# Patient Record
Sex: FEMALE | Race: WHITE | Employment: FULL TIME | URBAN - NONMETROPOLITAN AREA
[De-identification: names, ages, dates, MRNs, and addresses within clinical notes are randomized per-mention and may not be internally consistent; named-entity substitution may affect disease eponyms.]

---

## 2019-04-08 ENCOUNTER — HOSPITAL ENCOUNTER (EMERGENCY)
Age: 49
Discharge: HOME | End: 2019-04-08
Payer: MEDICARE

## 2019-04-08 VITALS
HEART RATE: 56 BPM | DIASTOLIC BLOOD PRESSURE: 58 MMHG | TEMPERATURE: 99.1 F | WEIGHT: 119 LBS | RESPIRATION RATE: 16 BRPM | OXYGEN SATURATION: 98 % | BODY MASS INDEX: 18.64 KG/M2 | SYSTOLIC BLOOD PRESSURE: 115 MMHG

## 2019-04-08 DIAGNOSIS — K12.1 STOMATITIS: Primary | ICD-10-CM

## 2019-04-08 LAB
BASOPHILS # BLD: 0.6 %
BASOPHILS ABSOLUTE: 0 THOU/MM3 (ref 0–0.1)
EOSINOPHIL # BLD: 1.8 %
EOSINOPHILS ABSOLUTE: 0.1 THOU/MM3 (ref 0–0.4)
HCT VFR BLD CALC: 34.2 % (ref 37–47)
HEMOGLOBIN: 11.2 GM/DL (ref 12–16)
IMMATURE GRANS (ABS): 0.02 THOU/MM3 (ref 0–0.07)
IMMATURE GRANULOCYTES: 0.3 %
LYMPHOCYTES # BLD: 16 %
LYMPHOCYTES ABSOLUTE: 1 THOU/MM3 (ref 1–4.8)
MCH RBC QN AUTO: 28.6 PG (ref 27–31)
MCHC RBC AUTO-ENTMCNC: 32.7 GM/DL (ref 33–37)
MCV RBC AUTO: 88 FL (ref 81–99)
MONOCYTES # BLD: 8.3 %
MONOCYTES ABSOLUTE: 0.5 THOU/MM3 (ref 0.4–1.3)
NUCLEATED RED BLOOD CELLS: 0 /100 WBC
PDW BLD-RTO: 15.5 % (ref 11.5–14.5)
PLATELET # BLD: 227 THOU/MM3 (ref 130–400)
PMV BLD AUTO: 12.6 FL (ref 7.4–10.4)
RBC # BLD: 3.91 MILL/MM3 (ref 4.2–5.4)
SEG NEUTROPHILS: 73 %
SEGMENTED NEUTROPHILS ABSOLUTE COUNT: 4.5 THOU/MM3 (ref 1.8–7.7)
WBC # BLD: 6.2 THOU/MM3 (ref 4.8–10.8)

## 2019-04-08 PROCEDURE — 36415 COLL VENOUS BLD VENIPUNCTURE: CPT

## 2019-04-08 PROCEDURE — 99202 OFFICE O/P NEW SF 15 MIN: CPT | Performed by: NURSE PRACTITIONER

## 2019-04-08 PROCEDURE — 99203 OFFICE O/P NEW LOW 30 MIN: CPT

## 2019-04-08 PROCEDURE — 85025 COMPLETE CBC W/AUTO DIFF WBC: CPT

## 2019-04-08 RX ORDER — VALACYCLOVIR HYDROCHLORIDE 1 G/1
1000 TABLET, FILM COATED ORAL 3 TIMES DAILY
Qty: 21 TABLET | Refills: 0 | Status: SHIPPED | OUTPATIENT
Start: 2019-04-08 | End: 2019-04-15

## 2019-04-08 ASSESSMENT — ENCOUNTER SYMPTOMS
NAUSEA: 0
VOMITING: 0
ABDOMINAL PAIN: 0
VOICE CHANGE: 0
SHORTNESS OF BREATH: 0
SORE THROAT: 0

## 2019-04-08 ASSESSMENT — PAIN SCALES - GENERAL: PAINLEVEL_OUTOF10: 4

## 2019-04-08 ASSESSMENT — PAIN - FUNCTIONAL ASSESSMENT: PAIN_FUNCTIONAL_ASSESSMENT: ACTIVITIES ARE NOT PREVENTED

## 2019-04-08 ASSESSMENT — PAIN DESCRIPTION - LOCATION: LOCATION: MOUTH

## 2019-04-08 ASSESSMENT — PAIN DESCRIPTION - PAIN TYPE: TYPE: ACUTE PAIN

## 2019-04-08 NOTE — ED PROVIDER NOTES
[facility]       CHIEF COMPLAINT       Chief Complaint   Patient presents with    Mouth Lesions     Sores under tongue. Started today. Nurses Notes reviewed and I agree except as noted in the HPI. HISTORY OF PRESENT ILLNESS   Ainsley Storey is a 50 y.o. female who presents for evaluation of tongue/mouth lesions. Patient reports that roughly 2 weeks ago she began have canker sores on her lower lip. She states that these have not one away, however today she noticed \"bumps\" on the underside of her tongue. She states that she is sure that these first started today and it seems to be spreading quickly. She states that she has also had a \"codeine\" over the top of her tongue. She states that she has been under a lot of stress recently. She reports a history of anemia and vitamin B12 deficiency and is concerned that this could be an issue. The history is provided by the patient. REVIEW OF SYSTEMS     Review of Systems   Constitutional: Negative for chills and fever. HENT: Positive for mouth sores. Negative for sore throat and voice change. Respiratory: Negative for shortness of breath. Cardiovascular: Negative for chest pain. Gastrointestinal: Negative for abdominal pain, nausea and vomiting. Musculoskeletal: Negative for arthralgias and myalgias. Skin: Negative for rash. PAST MEDICAL HISTORY         Diagnosis Date    Acute CVA (cerebrovascular accident) [facility]) 1998    Dr. Becca Phillips antibody syndrome [facility])     takes Coumadin    Arrhythmia     Disease of blood and blood forming organ     SVT (supraventricular tachycardia) ([address]        SURGICALHISTORY     Patient  has a past surgical history that includes ablation of dysrhythmic focus; Atrial ablation surgery; Atrial ablation surgery; and Atrial ablation surgery.     CURRENT MEDICATIONS       Previous Medications    ALPRAZOLAM (XANAX PO)    Take by mouth ATENOLOL (TENORMIN) 50 MG TABLET    Take 50 mg by mouth 2 times daily. VERAPAMIL (VERELAN PM) 240 MG CR CAPSULE    Take 240 mg by mouth nightly. WARFARIN (COUMADIN) 5 MG TABLET    Take 5 mg by mouth daily. ALLERGIES     Patient is is allergic to bactrim [sulfamethoxazole-trimethoprim]; erythromycin; other; shellfish-derived products; shrimp flavor; tetracyclines & related; and augmentin [amoxicillin-pot clavulanate]. Patients   There is no immunization history on file for this patient. FAMILY HISTORY     Patient's family history is not on file. SOCIAL HISTORY     Patient  reports that she has never smoked. She has never used smokeless tobacco. She reports that she does not drink alcohol or use drugs. PHYSICAL EXAM     ED TRIAGE VITALS  BP: (!) 115/58, Temp: 99.1 °F (37.3 °C), Pulse: 56, Resp: 16, SpO2: 98 %,Estimated body mass index is 18.64 kg/m² as calculated from the following:    Height as of 6/24/14: 5' 7\" (1.702 m). Weight as of this encounter: 119 lb (54 kg). ,Patient's last menstrual period was 03/18/2019 (exact date). Physical Exam   Constitutional: She is oriented to person, place, and time. She appears well-developed and well-nourished. No distress. HENT:   Mouth/Throat: Oral lesions present. No dental caries. Canker sore is noted to the right lower lip. There are multiple reddened, raised bumps noted to the underside of the tongue as well as a creamy white coating to the top of the tongue noted. Pulmonary/Chest: Effort normal. No respiratory distress. Neurological: She is alert and oriented to person, place, and time. No cranial nerve deficit. Skin: Skin is warm and dry. No rash noted. She is not diaphoretic. Psychiatric: She has a normal mood and affect. Nursing note and vitals reviewed.       DIAGNOSTIC RESULTS     Labs:  Results for orders placed or performed during the hospital encounter of 04/08/19   CBC auto differential   Result Value Ref Range    WBC 6.2

## 2021-09-24 ENCOUNTER — TELEPHONE (OUTPATIENT)
Dept: FAMILY MEDICINE CLINIC | Age: 51
End: 2021-09-24

## 2021-09-24 ENCOUNTER — OFFICE VISIT (OUTPATIENT)
Dept: FAMILY MEDICINE CLINIC | Age: 51
End: 2021-09-24
Payer: MEDICARE

## 2021-09-24 VITALS
SYSTOLIC BLOOD PRESSURE: 108 MMHG | TEMPERATURE: 97.3 F | WEIGHT: 125.8 LBS | RESPIRATION RATE: 16 BRPM | OXYGEN SATURATION: 99 % | BODY MASS INDEX: 19.74 KG/M2 | HEART RATE: 54 BPM | HEIGHT: 67 IN | DIASTOLIC BLOOD PRESSURE: 62 MMHG

## 2021-09-24 DIAGNOSIS — D68.61 ANTI-PHOSPHOLIPID ANTIBODY SYNDROME (HCC): Primary | ICD-10-CM

## 2021-09-24 DIAGNOSIS — F41.1 GAD (GENERALIZED ANXIETY DISORDER): ICD-10-CM

## 2021-09-24 PROCEDURE — 3017F COLORECTAL CA SCREEN DOC REV: CPT | Performed by: NURSE PRACTITIONER

## 2021-09-24 PROCEDURE — 99204 OFFICE O/P NEW MOD 45 MIN: CPT | Performed by: NURSE PRACTITIONER

## 2021-09-24 PROCEDURE — 1036F TOBACCO NON-USER: CPT | Performed by: NURSE PRACTITIONER

## 2021-09-24 PROCEDURE — G8420 CALC BMI NORM PARAMETERS: HCPCS | Performed by: NURSE PRACTITIONER

## 2021-09-24 PROCEDURE — G8427 DOCREV CUR MEDS BY ELIG CLIN: HCPCS | Performed by: NURSE PRACTITIONER

## 2021-09-24 RX ORDER — ALPRAZOLAM 0.25 MG/1
TABLET ORAL
Status: CANCELLED | OUTPATIENT
Start: 2021-09-24

## 2021-09-24 RX ORDER — ALPRAZOLAM 0.25 MG/1
0.25 TABLET ORAL 2 TIMES DAILY PRN
Qty: 60 TABLET | Refills: 0 | Status: SHIPPED | OUTPATIENT
Start: 2021-09-24 | End: 2021-10-25 | Stop reason: SDUPTHER

## 2021-09-24 RX ORDER — WARFARIN SODIUM 5 MG/1
5 TABLET ORAL DAILY
Qty: 30 TABLET | Refills: 1 | Status: SHIPPED | OUTPATIENT
Start: 2021-09-24 | End: 2021-10-22

## 2021-09-24 RX ORDER — ALPRAZOLAM 0.25 MG/1
TABLET ORAL
COMMUNITY
Start: 2021-07-13 | End: 2021-09-24 | Stop reason: SDUPTHER

## 2021-09-24 SDOH — ECONOMIC STABILITY: FOOD INSECURITY: WITHIN THE PAST 12 MONTHS, YOU WORRIED THAT YOUR FOOD WOULD RUN OUT BEFORE YOU GOT MONEY TO BUY MORE.: NEVER TRUE

## 2021-09-24 SDOH — ECONOMIC STABILITY: FOOD INSECURITY: WITHIN THE PAST 12 MONTHS, THE FOOD YOU BOUGHT JUST DIDN'T LAST AND YOU DIDN'T HAVE MONEY TO GET MORE.: NEVER TRUE

## 2021-09-24 ASSESSMENT — PATIENT HEALTH QUESTIONNAIRE - PHQ9
SUM OF ALL RESPONSES TO PHQ QUESTIONS 1-9: 0
2. FEELING DOWN, DEPRESSED OR HOPELESS: 0
SUM OF ALL RESPONSES TO PHQ QUESTIONS 1-9: 0
SUM OF ALL RESPONSES TO PHQ QUESTIONS 1-9: 0
SUM OF ALL RESPONSES TO PHQ9 QUESTIONS 1 & 2: 0
1. LITTLE INTEREST OR PLEASURE IN DOING THINGS: 0

## 2021-09-24 ASSESSMENT — SOCIAL DETERMINANTS OF HEALTH (SDOH): HOW HARD IS IT FOR YOU TO PAY FOR THE VERY BASICS LIKE FOOD, HOUSING, MEDICAL CARE, AND HEATING?: NOT VERY HARD

## 2021-09-24 NOTE — PROGRESS NOTES
[address] MEDICINE [address]  [address] [place] [address]  Dept: [phone]  Loc: [phone]  Visit Date: 9/24/2021      Chief Complaint:   Wes Hernandez is a 46 y.o. female thatpresents for Medication Refill    HPI:  Comes in today to get established and for refills  On Warfarin for Anti-phospholipid Antibody Syndrome  Taking 5 mg daily  No INR for about 3 weeks     Diet: eats well, drinking a lot of water  Exercise: running   Sleep: at times when anxious  PMH: stroke,SVT, anti-phospholipid antibody syndrome, KIMANI  Surgical hx:ablations  Social: non-smoker, non-drinker  Concerns today: needs refills and labs    Anxiety   Inciting events or triggers for anxiety - daily stressors and medical problems   Frequency of anxiety - daily  Panic attacks? Yes   Symptoms of panic attacks -  feelings of losing control and racing thoughts  Sleep Disturbances? Yes - falling asleep   Impaired concentration? Yes  Substance abuse? No  Suicidal/Homicidal Ideation? No    Compliant with meds: yes  Med side effects: No    Sees therapist?: Yes - seeing one now  Family History of Mental Illness? No      The patient comes in alone today. History obtained from pt and medical records. I have reviewed the patient's past medical history, past surgical history, allergies,medications, social and family history and I have made updates where appropriate.     Past Medical History:   Diagnosis Date    Acute CVA (cerebrovascular accident) [facility]) 1998    Dr. Gan Ar antibody syndrome [facility])     takes Coumadin    Arrhythmia     Disease of blood and blood forming organ     SVT (supraventricular tachycardia) (HCC)        Past Surgical History:   Procedure Laterality Date    ABLATION OF DYSRHYTHMIC FOCUS      ATRIAL ABLATION SURGERY      ATRIAL ABLATION SURGERY      ATRIAL ABLATION SURGERY         Social History     Tobacco Use    Smoking status: Never Smoker    Smokeless tobacco: Never Used   Substance Use Topics    Alcohol use: No    Drug use: No       History reviewed. No pertinent family history. PHYSICAL EXAM:  /62   Pulse 54   Temp 97.3 °F (36.3 °C) (Infrared)   Resp 16   Ht 5' 7\" (1.702 m)   Wt 125 lb 12.8 oz (57.1 kg)   LMP 07/16/2021   SpO2 99%   BMI 19.70 kg/m²     Physical Exam  Constitutional:       Appearance: Normal appearance. HENT:      Head: Normocephalic and atraumatic. Right Ear: External ear normal.      Left Ear: External ear normal.      Nose: Nose normal.      Mouth/Throat:      Mouth: Mucous membranes are moist.   Eyes:      Conjunctiva/sclera: Conjunctivae normal.   Cardiovascular:      Rate and Rhythm: Normal rate and regular rhythm. Pulses: Normal pulses. Heart sounds: Normal heart sounds. Pulmonary:      Effort: Pulmonary effort is normal.      Breath sounds: Normal breath sounds. Abdominal:      General: Bowel sounds are normal.      Palpations: Abdomen is soft. Musculoskeletal:         General: Normal range of motion. Cervical back: Normal range of motion. Skin:     General: Skin is warm and dry. Neurological:      General: No focal deficit present. Mental Status: She is alert and oriented to person, place, and time. Psychiatric:         Mood and Affect: Mood normal.         Behavior: Behavior normal.             ASSESSMENT & PLAN  Patricio Luevano was seen today for medication refill. Diagnoses and all orders for this visit:    Anti-phospholipid antibody syndrome (HCC)  -     warfarin (COUMADIN) 5 MG tablet; Take 1 tablet by mouth daily  -     Protime-INR; Future  -     CBC Auto Differential; Future  -     Comprehensive Metabolic Panel; Future  -     Lipid Panel; Future  -     Sedimentation Rate;  Future  -     PT/INR Testing Monitor KIT; 1 kit by Does not apply route as needed (weekly prn to monitor INR)    KIMANI (generalized anxiety disorder)  -     CBC Auto Differential; Future  -     Comprehensive

## 2021-09-24 NOTE — TELEPHONE ENCOUNTER
Can we get recent records from Dr. Olivares [facility] office?   Electronically signed by PB Miranda CNP on 9/24/2021 at 3:58 PM

## 2021-09-24 NOTE — PATIENT INSTRUCTIONS
Follow up in 1 month      Patient Education        Learning About Generalized Anxiety Disorder  What is generalized anxiety disorder? We all worry. It's a normal part of life. But when you have generalized anxiety disorder, you worry about lots of things and have a hard time stopping your worry. This worry or anxiety interferes with your relationships, work, and life. What causes it? The cause of generalized anxiety disorder is not known. Some studies show that it might be passed down through families. Several things can cause symptoms of anxiety. They include some health problems, some medicines, too much caffeine, and illegal drugs such as cocaine. What are the symptoms? Generalized anxiety disorder can make you feel worried and stressed about many things almost every day. You may have a hard time controlling your worry. Symptoms include:  · Feeling tired or cranky. You may have a hard time concentrating. · Having headaches or muscle aches. · Feeling shaky, sweating, or having hot flashes. · Feeling lightheaded, sick to your stomach, or out of breath. · Feeling like you can't relax. Or being startled easily. · Having problems falling or staying asleep. How is it diagnosed? Your doctor will ask about your health and how often you worry or feel anxious. People with generalized anxiety disorder have more worry and stress than normal. They feel worried and stressed about many things almost every day. And these feelings have lasted for at least 6 months. Your doctor also may ask about other symptoms, like whether you:  · Feel restless. · Feel tired. · Have a hard time thinking or feel that your mind goes blank. · Feel cranky. · Have tense muscles. · Have sleep problems. A physical exam and tests can help make sure that your symptoms aren't caused by a different condition, such as a thyroid problem. How is it treated? Counseling and medicine can both work to treat anxiety.  The two are often used along with lifestyle changes. Cognitive-behavioral therapy (CBT) is a type of counseling that's used to help treat anxiety. In CBT, you learn how to notice and replace thoughts that make you feel worried. It also can help you learn how to relax when you worry. Applied relaxation therapy may also be used. Your counselor might ask you to imagine a calming situation. This can help you relax. Medicines can help. These medicines are often also used for depression. Selective serotonin reuptake inhibitors (SSRIs) are often tried first. But there are other medicines that your doctor may use. You may need to try a few medicines to find one that works well. Many people feel better by getting regular exercise, eating healthy meals, and getting good sleep. Mindfulness--focusing on things in the present moment--also can help reduce your anxiety. What can you expect when you have it? Having anxiety can be upsetting. Some people might feel less worried and stressed after a couple of months of treatment. But for other people, it might take longer to feel better. Reaching out to people for help is important. Try not to isolate yourself. Let your family and friends help you. Find someone you can trust and confide in. Talk to that person. When you know what anxiety is--and how you can get help for it--you can start to learn new ways of thinking. This can help you cope and work through your anxiety. Follow-up care is a key part of your treatment and safety. Be sure to make and go to all appointments, and call your doctor if you are having problems. It's also a good idea to know your test results and keep a list of the medicines you take. Where can you learn more? Go to https://cas.[employer]. org and sign in to your [employer] account. Enter G110 in the FlightStats box to learn more about \"Learning About Generalized Anxiety Disorder. \"     If you do not have an account, please click on the \"Sign Up Now\"

## 2021-09-30 ENCOUNTER — TELEPHONE (OUTPATIENT)
Dept: FAMILY MEDICINE CLINIC | Age: 51
End: 2021-09-30

## 2021-10-14 NOTE — TELEPHONE ENCOUNTER
I remember signing a paper for her home INR machine. Have we heard anything back from them? It's ok that she hasn't been able to get her labs done. Whenever she gets a chance is fine with me.       Electronically signed by PB Garrett CNP on 10/14/2021 at 12:31 PM

## 2021-10-14 NOTE — TELEPHONE ENCOUNTER
Called and spoke with Edward Bravo at Dr Caal's office at [phone]. They will send most recent previous records to our office. She states if pt's whole record is required, they will need pt to sign a release of records at our office and we can fax it to them at fax [phone] and they will get them faxed to us. Pt states she has not had time to get her lab work done. She has been working at a [employer] unit, and then a family member recently passed away. She wanted Deana Nagel to know did not want to be knows as non-compliant. She is wanting to know if we have found anything out about the INR mach?

## 2021-10-15 ENCOUNTER — NURSE ONLY (OUTPATIENT)
Dept: LAB | Age: 51
End: 2021-10-15

## 2021-10-15 DIAGNOSIS — F41.1 GAD (GENERALIZED ANXIETY DISORDER): ICD-10-CM

## 2021-10-15 DIAGNOSIS — D68.61 ANTI-PHOSPHOLIPID ANTIBODY SYNDROME (HCC): ICD-10-CM

## 2021-10-15 LAB
ALBUMIN SERPL-MCNC: 4.5 G/DL (ref 3.5–5.1)
ALP BLD-CCNC: 95 U/L (ref 38–126)
ALT SERPL-CCNC: 14 U/L (ref 11–66)
ANION GAP SERPL CALCULATED.3IONS-SCNC: 10 MEQ/L (ref 8–16)
AST SERPL-CCNC: 19 U/L (ref 5–40)
BASOPHILS # BLD: 1 %
BASOPHILS ABSOLUTE: 0.1 THOU/MM3 (ref 0–0.1)
BILIRUB SERPL-MCNC: 0.2 MG/DL (ref 0.3–1.2)
BUN BLDV-MCNC: 15 MG/DL (ref 7–22)
CALCIUM SERPL-MCNC: 9.7 MG/DL (ref 8.5–10.5)
CHLORIDE BLD-SCNC: 103 MEQ/L (ref 98–111)
CO2: 28 MEQ/L (ref 23–33)
CREAT SERPL-MCNC: 0.9 MG/DL (ref 0.4–1.2)
EOSINOPHIL # BLD: 3.2 %
EOSINOPHILS ABSOLUTE: 0.2 THOU/MM3 (ref 0–0.4)
ERYTHROCYTE [DISTWIDTH] IN BLOOD BY AUTOMATED COUNT: 15.3 % (ref 11.5–14.5)
ERYTHROCYTE [DISTWIDTH] IN BLOOD BY AUTOMATED COUNT: 52.8 FL (ref 35–45)
GFR SERPL CREATININE-BSD FRML MDRD: 66 ML/MIN/1.73M2
GLUCOSE BLD-MCNC: 92 MG/DL (ref 70–108)
HCT VFR BLD CALC: 42.5 % (ref 37–47)
HEMOGLOBIN: 12.9 GM/DL (ref 12–16)
IMMATURE GRANS (ABS): 0.01 THOU/MM3 (ref 0–0.07)
IMMATURE GRANULOCYTES: 0.2 %
INR BLD: 3.16 (ref 0.85–1.13)
LYMPHOCYTES # BLD: 16.3 %
LYMPHOCYTES ABSOLUTE: 1 THOU/MM3 (ref 1–4.8)
MCH RBC QN AUTO: 28.2 PG (ref 26–33)
MCHC RBC AUTO-ENTMCNC: 30.4 GM/DL (ref 32.2–35.5)
MCV RBC AUTO: 93 FL (ref 81–99)
MONOCYTES # BLD: 9 %
MONOCYTES ABSOLUTE: 0.5 THOU/MM3 (ref 0.4–1.3)
NUCLEATED RED BLOOD CELLS: 0 /100 WBC
PLATELET # BLD: 256 THOU/MM3 (ref 130–400)
PMV BLD AUTO: 12.6 FL (ref 9.4–12.4)
POTASSIUM SERPL-SCNC: 4.5 MEQ/L (ref 3.5–5.2)
RBC # BLD: 4.57 MILL/MM3 (ref 4.2–5.4)
SEDIMENTATION RATE, ERYTHROCYTE: 5 MM/HR (ref 0–20)
SEG NEUTROPHILS: 70.3 %
SEGMENTED NEUTROPHILS ABSOLUTE COUNT: 4.1 THOU/MM3 (ref 1.8–7.7)
SODIUM BLD-SCNC: 141 MEQ/L (ref 135–145)
TOTAL PROTEIN: 7.1 G/DL (ref 6.1–8)
WBC # BLD: 5.9 THOU/MM3 (ref 4.8–10.8)

## 2021-10-20 DIAGNOSIS — D68.61 ANTI-PHOSPHOLIPID ANTIBODY SYNDROME (HCC): Primary | ICD-10-CM

## 2021-10-21 ENCOUNTER — TELEPHONE (OUTPATIENT)
Dept: FAMILY MEDICINE CLINIC | Age: 51
End: 2021-10-21

## 2021-10-21 NOTE — TELEPHONE ENCOUNTER
Patient stated she has an appt with Jorge Lambert on 10/22 to discuss lab results. Verified appt on appt desk.

## 2021-10-22 ENCOUNTER — OFFICE VISIT (OUTPATIENT)
Dept: FAMILY MEDICINE CLINIC | Age: 51
End: 2021-10-22
Payer: MEDICARE

## 2021-10-22 VITALS
WEIGHT: 126.6 LBS | HEART RATE: 54 BPM | RESPIRATION RATE: 16 BRPM | TEMPERATURE: 97.6 F | DIASTOLIC BLOOD PRESSURE: 52 MMHG | SYSTOLIC BLOOD PRESSURE: 100 MMHG | HEIGHT: 67 IN | OXYGEN SATURATION: 99 % | BODY MASS INDEX: 19.87 KG/M2

## 2021-10-22 DIAGNOSIS — I10 HYPERTENSION, UNSPECIFIED TYPE: ICD-10-CM

## 2021-10-22 DIAGNOSIS — F41.1 GAD (GENERALIZED ANXIETY DISORDER): Primary | ICD-10-CM

## 2021-10-22 DIAGNOSIS — Z20.822 ENCOUNTER FOR SCREENING LABORATORY TESTING FOR COVID-19 VIRUS: ICD-10-CM

## 2021-10-22 DIAGNOSIS — D68.61 ANTI-PHOSPHOLIPID ANTIBODY SYNDROME (HCC): ICD-10-CM

## 2021-10-22 DIAGNOSIS — M79.673 PAIN OF FOOT, UNSPECIFIED LATERALITY: ICD-10-CM

## 2021-10-22 PROCEDURE — G8420 CALC BMI NORM PARAMETERS: HCPCS | Performed by: NURSE PRACTITIONER

## 2021-10-22 PROCEDURE — G8484 FLU IMMUNIZE NO ADMIN: HCPCS | Performed by: NURSE PRACTITIONER

## 2021-10-22 PROCEDURE — 1036F TOBACCO NON-USER: CPT | Performed by: NURSE PRACTITIONER

## 2021-10-22 PROCEDURE — 3017F COLORECTAL CA SCREEN DOC REV: CPT | Performed by: NURSE PRACTITIONER

## 2021-10-22 PROCEDURE — G8427 DOCREV CUR MEDS BY ELIG CLIN: HCPCS | Performed by: NURSE PRACTITIONER

## 2021-10-22 PROCEDURE — 99214 OFFICE O/P EST MOD 30 MIN: CPT | Performed by: NURSE PRACTITIONER

## 2021-10-22 RX ORDER — ATENOLOL 50 MG/1
50 TABLET ORAL 2 TIMES DAILY
Qty: 180 TABLET | Refills: 3 | Status: SHIPPED | OUTPATIENT
Start: 2021-10-22

## 2021-10-22 RX ORDER — PREDNISONE 20 MG/1
40 TABLET ORAL DAILY
Qty: 10 TABLET | Refills: 0 | Status: SHIPPED | OUTPATIENT
Start: 2021-10-22 | End: 2021-10-27

## 2021-10-22 RX ORDER — WARFARIN SODIUM 7.5 MG/1
7.5 TABLET ORAL DAILY
Qty: 30 TABLET | Refills: 3 | Status: SHIPPED | OUTPATIENT
Start: 2021-10-22 | End: 2021-12-21 | Stop reason: SDUPTHER

## 2021-10-22 NOTE — PROGRESS NOTES
[address] MEDICINE [address]  [address] [place] [address]  Dept: [phone]  Loc: [phone]  Visit Date: 10/22/2021      Chief Complaint:   Lionel Montana is a 46 y.o. female thatpresents for Follow-up (KIMANI still having anxiety) and Office Visit for Anticoagulation Management      HPI:    Anxiety     Inciting events or triggers for anxiety - daily stressors   Frequency of anxiety - daily  Panic attacks?  occasionally  Symptoms of panic attacks -  difficulty concentrating and racing thoughts  Sleep Disturbances? Yes - falling asleep  Impaired concentration? Yes  Substance abuse? No  Suicidal/Homicidal Ideation? No    Compliant with meds: yes  Med side effects: No    Sees therapist?: No  Family History of Mental Illness? unknown    The patient comes in with her spouse today. History obtained from pt, spouse, and medical records. I have reviewed the patient's past medical history, past surgical history, allergies,medications, social and family history and I have made updates where appropriate. Past Medical History:   Diagnosis Date    Acute CVA (cerebrovascular accident) ([facility] 1998    Dr. Anastacio Zaman antibody syndrome ([facility]     takes Coumadin    Arrhythmia     Disease of blood and blood forming organ     SVT (supraventricular tachycardia) (HCC)        Past Surgical History:   Procedure Laterality Date    ABLATION OF DYSRHYTHMIC FOCUS      ATRIAL ABLATION SURGERY      ATRIAL ABLATION SURGERY      ATRIAL ABLATION SURGERY         Social History     Tobacco Use    Smoking status: Never Smoker    Smokeless tobacco: Never Used   Substance Use Topics    Alcohol use: No    Drug use: No       History reviewed. No pertinent family history.         PHYSICAL EXAM:  BP (!) 100/52   Pulse 54   Temp 97.6 °F (36.4 °C) (Infrared)   Resp 16   Ht 5' 7\" (1.702 m)   Wt 126 lb 9.6 oz (57.4 kg)   SpO2 99%   BMI 19.83 kg/m²     Physical Exam  Constitutional:       Appearance: Normal appearance. HENT:      Head: Normocephalic and atraumatic. Right Ear: External ear normal.      Left Ear: External ear normal.      Nose: Nose normal.      Mouth/Throat:      Mouth: Mucous membranes are moist.   Eyes:      Conjunctiva/sclera: Conjunctivae normal.   Cardiovascular:      Rate and Rhythm: Normal rate and regular rhythm. Pulses: Normal pulses. Heart sounds: Normal heart sounds. Pulmonary:      Effort: Pulmonary effort is normal.      Breath sounds: Normal breath sounds. Abdominal:      General: Bowel sounds are normal.      Palpations: Abdomen is soft. Musculoskeletal:         General: Normal range of motion. Cervical back: Normal range of motion. Skin:     General: Skin is warm and dry. Neurological:      General: No focal deficit present. Mental Status: She is alert and oriented to person, place, and time. Psychiatric:         Mood and Affect: Mood normal.         Behavior: Behavior normal.             ASSESSMENT & PLAN  Blayne Green was seen today for follow-up and office visit for anticoagulation management. Diagnoses and all orders for this visit:    KIMANI (generalized anxiety disorder)  -     [address] [facility], [facility] [address], [facility], [facility]    Hypertension, unspecified type  -     atenolol (TENORMIN) 50 MG tablet; Take 1 tablet by mouth 2 times daily    Pain of foot, unspecified laterality  -     predniSONE (DELTASONE) 20 MG tablet; Take 2 tablets by mouth daily for 5 days    Anti-phospholipid antibody syndrome (HCC)  -     warfarin (COUMADIN) 7.5 MG tablet; Take 1 tablet by mouth daily    Encounter for screening laboratory testing for COVID-19 virus  -     Covid-19, Antibody, Total; Future    Continue Xanax prn  Follow up in 1 month  Needs INR in 1 week, order already placed    No follow-ups on file.       [place] received counseling on the following healthy behaviors: nutrition, exercise and medication adherence  Reviewedprior labs and health maintenance. Continue current medications, diet and exercise. Discussed use, benefit, and side effects of prescribed medications. Barriers to medication compliance addressed. Patient given educationalmaterials - see patient instructions. All patient questions answered. Patient voiced understanding.      Electronically signed by PB Akhtar - CNP, APRN on 10/22/21 at 3:11 PM EDT

## 2021-10-22 NOTE — PATIENT INSTRUCTIONS
RTO in 1 mos      Patient Education        Learning About Anxiety Disorders  What are anxiety disorders? Anxiety disorders are a type of medical problem. They cause severe anxiety. When you feel anxious, you feel that something bad is about to happen. This feeling interferes with your life. These disorders include:  · Generalized anxiety disorder. You feel worried and stressed about many everyday events and activities. This goes on for several months and disrupts your life on most days. · Panic disorder. You have repeated panic attacks. A panic attack is a sudden, intense fear or anxiety. It may make you feel short of breath. Your heart may pound. · Social anxiety disorder. You feel very anxious about what you will say or do in front of people. For example, you may be scared to talk or eat in public. This problem affects your daily life. · Phobias. You are very scared of a specific object, situation, or activity. For example, you may fear spiders, high places, or small spaces. What are the symptoms? Generalized anxiety disorder  Symptoms may include:  · Feeling worried and stressed about many things almost every day. · Feeling tired or irritable. You may have a hard time concentrating. · Having headaches or muscle aches. · Having a hard time getting to sleep or staying asleep. Panic disorder  You may have repeated panic attacks when there is no reason for feeling afraid. You may change your daily activities because you worry that you will have another attack. Symptoms may include:  · Intense fear, terror, or anxiety. · Trouble breathing or very fast breathing. · Chest pain or tightness. · A heartbeat that races or is not regular. Social anxiety disorder  Symptoms may include:  · Fear about a social situation, such as eating in front of others or speaking in public. You may worry a lot. Or you may be afraid that something bad will happen.   · Anxiety that can cause you to blush, sweat, and feel 1196-6772 [employer]. Care instructions adapted under license by [facility] ([facility]). If you have questions about a medical condition or this instruction, always ask your healthcare professional. [address] any warranty or liability for your use of this information.

## 2021-10-25 ENCOUNTER — PATIENT MESSAGE (OUTPATIENT)
Dept: FAMILY MEDICINE CLINIC | Age: 51
End: 2021-10-25

## 2021-10-25 DIAGNOSIS — B37.9 YEAST INFECTION: Primary | ICD-10-CM

## 2021-10-25 DIAGNOSIS — F41.1 GAD (GENERALIZED ANXIETY DISORDER): ICD-10-CM

## 2021-10-25 RX ORDER — FLUCONAZOLE 150 MG/1
TABLET ORAL
Qty: 2 TABLET | Refills: 0 | Status: SHIPPED | OUTPATIENT
Start: 2021-10-25 | End: 2022-04-08

## 2021-10-25 RX ORDER — ALPRAZOLAM 0.25 MG/1
0.25 TABLET ORAL 2 TIMES DAILY PRN
Qty: 60 TABLET | Refills: 0 | Status: SHIPPED | OUTPATIENT
Start: 2021-10-25 | End: 2021-11-22 | Stop reason: SDUPTHER

## 2021-11-03 ENCOUNTER — TELEPHONE (OUTPATIENT)
Dept: FAMILY MEDICINE CLINIC | Age: 51
End: 2021-11-03

## 2021-11-03 NOTE — TELEPHONE ENCOUNTER
----- Message from [address] [facility] [address] [address] sent at 11/3/2021  1:55 PM EDT -----  Subject: Message to Provider    QUESTIONS  Information for Provider? Wilson Reyes is requesting for Marie to return   her call.  ---------------------------------------------------------------------------  --------------  [address]  What is the best way for the office to contact you? OK to leave message on   voicemail  Preferred Call Back Phone Number? [phone]  ---------------------------------------------------------------------------  --------------  SCRIPT ANSWERS  Relationship to Patient?  Self

## 2021-11-05 NOTE — TELEPHONE ENCOUNTER
Letter placed in chart  Ok to print and stamp it  Or I can sign it Monday  Electronically signed by PB Floyd CNP on 11/5/2021 at 11:39 AM

## 2021-11-22 ENCOUNTER — OFFICE VISIT (OUTPATIENT)
Dept: FAMILY MEDICINE CLINIC | Age: 51
End: 2021-11-22
Payer: MEDICARE

## 2021-11-22 VITALS
WEIGHT: 126.4 LBS | HEIGHT: 67 IN | OXYGEN SATURATION: 98 % | SYSTOLIC BLOOD PRESSURE: 112 MMHG | DIASTOLIC BLOOD PRESSURE: 70 MMHG | HEART RATE: 52 BPM | BODY MASS INDEX: 19.84 KG/M2 | RESPIRATION RATE: 16 BRPM | TEMPERATURE: 97.2 F

## 2021-11-22 DIAGNOSIS — Z12.31 ENCOUNTER FOR SCREENING MAMMOGRAM FOR MALIGNANT NEOPLASM OF BREAST: Primary | ICD-10-CM

## 2021-11-22 DIAGNOSIS — L30.9 ECZEMA, UNSPECIFIED TYPE: ICD-10-CM

## 2021-11-22 DIAGNOSIS — F41.1 GAD (GENERALIZED ANXIETY DISORDER): ICD-10-CM

## 2021-11-22 DIAGNOSIS — R04.0 BLOODY NOSE: ICD-10-CM

## 2021-11-22 PROCEDURE — G8420 CALC BMI NORM PARAMETERS: HCPCS | Performed by: NURSE PRACTITIONER

## 2021-11-22 PROCEDURE — G8484 FLU IMMUNIZE NO ADMIN: HCPCS | Performed by: NURSE PRACTITIONER

## 2021-11-22 PROCEDURE — 3017F COLORECTAL CA SCREEN DOC REV: CPT | Performed by: NURSE PRACTITIONER

## 2021-11-22 PROCEDURE — 99214 OFFICE O/P EST MOD 30 MIN: CPT | Performed by: NURSE PRACTITIONER

## 2021-11-22 PROCEDURE — G8427 DOCREV CUR MEDS BY ELIG CLIN: HCPCS | Performed by: NURSE PRACTITIONER

## 2021-11-22 PROCEDURE — 1036F TOBACCO NON-USER: CPT | Performed by: NURSE PRACTITIONER

## 2021-11-22 RX ORDER — ALPRAZOLAM 0.25 MG/1
0.25 TABLET ORAL 3 TIMES DAILY PRN
Qty: 90 TABLET | Refills: 0 | Status: SHIPPED | OUTPATIENT
Start: 2021-11-22 | End: 2021-12-21 | Stop reason: SDUPTHER

## 2021-11-22 RX ORDER — CLOBETASOL PROPIONATE 0.5 MG/G
OINTMENT TOPICAL
Qty: 60 G | Refills: 2 | Status: SHIPPED | OUTPATIENT
Start: 2021-11-22 | End: 2022-06-06 | Stop reason: SDUPTHER

## 2021-11-22 NOTE — PATIENT INSTRUCTIONS
Patient Education        Learning About Generalized Anxiety Disorder  What is generalized anxiety disorder? We all worry. It's a normal part of life. But when you have generalized anxiety disorder, you worry about lots of things and have a hard time stopping your worry. This worry or anxiety interferes with your relationships, work, and life. What causes it? The cause of generalized anxiety disorder is not known. Some studies show that it might be passed down through families. Several things can cause symptoms of anxiety. They include some health problems, some medicines, too much caffeine, and illegal drugs such as cocaine. What are the symptoms? Generalized anxiety disorder can make you feel worried and stressed about many things almost every day. You may have a hard time controlling your worry. Symptoms include:  · Feeling tired or cranky. You may have a hard time concentrating. · Having headaches or muscle aches. · Feeling shaky, sweating, or having hot flashes. · Feeling lightheaded, sick to your stomach, or out of breath. · Feeling like you can't relax. Or being startled easily. · Having problems falling or staying asleep. How is it diagnosed? Your doctor will ask about your health and how often you worry or feel anxious. People with generalized anxiety disorder have more worry and stress than normal. They feel worried and stressed about many things almost every day. And these feelings have lasted for at least 6 months. Your doctor also may ask about other symptoms, like whether you:  · Feel restless. · Feel tired. · Have a hard time thinking or feel that your mind goes blank. · Feel cranky. · Have tense muscles. · Have sleep problems. A physical exam and tests can help make sure that your symptoms aren't caused by a different condition, such as a thyroid problem. How is it treated? Counseling and medicine can both work to treat anxiety.  The two are often used along with lifestyle changes. Cognitive-behavioral therapy (CBT) is a type of counseling that's used to help treat anxiety. In CBT, you learn how to notice and replace thoughts that make you feel worried. It also can help you learn how to relax when you worry. Applied relaxation therapy may also be used. Your counselor might ask you to imagine a calming situation. This can help you relax. Medicines can help. These medicines are often also used for depression. Selective serotonin reuptake inhibitors (SSRIs) are often tried first. But there are other medicines that your doctor may use. You may need to try a few medicines to find one that works well. Many people feel better by getting regular exercise, eating healthy meals, and getting good sleep. Mindfulnessfocusing on things in the present momentalso can help reduce your anxiety. What can you expect when you have it? Having anxiety can be upsetting. Some people might feel less worried and stressed after a couple of months of treatment. But for other people, it might take longer to feel better. Reaching out to people for help is important. Try not to isolate yourself. Let your family and friends help you. Find someone you can trust and confide in. Talk to that person. When you know what anxiety isand how you can get help for ityou can start to learn new ways of thinking. This can help you cope and work through your anxiety. Follow-up care is a key part of your treatment and safety. Be sure to make and go to all appointments, and call your doctor if you are having problems. It's also a good idea to know your test results and keep a list of the medicines you take. Where can you learn more? Go to https://cas.Oneflare. org and sign in to your [employer] account. Enter G110 in the [employer] box to learn more about \"Learning About Generalized Anxiety Disorder. \"     If you do not have an account, please click on the \"Sign Up Now\" link.   Current as of: June 16, 2021               Content Version: 13.0  © 2896-2997 Healthwise, Incorporated. Care instructions adapted under license by [facility] ([facility] [place] [facility]). If you have questions about a medical condition or this instruction, always ask your healthcare professional. [address] any warranty or liability for your use of this information.

## 2021-11-22 NOTE — PROGRESS NOTES
[place] [address] [place] [address]  Dept: [phone]  Loc: [phone]  Visit Date: 11/22/2021      Chief Complaint:   Jeimy Younger is a 46 y.o. female thatpresents for 1 Month Follow-Up        HPI:  Anxiety     Inciting events or triggers for anxiety - daily stressors, stress in relationship   Frequency of anxiety - daily  Panic attacks? Yes - at times  Symptoms of panic attacks - heart racing, racing thoughts  Sleep Disturbances? No  Impaired concentration? Yes  Substance abuse? No  Suicidal/Homicidal Ideation? No    Compliant with meds: yes  Med side effects: No    Sees therapist?: Yes - referred to [address] [place] [address] History of Mental Illness? No        The patient comes in alone today. History obtained from pt and medical records. I have reviewed the patient's past medical history, past surgical history, allergies,medications, social and family history and I have made updates where appropriate. Past Medical History:   Diagnosis Date    Acute CVA [facility]cerebrovascular accident) ([facility] [address] 1998    Dr. Arslan Lino antibody syndrome ([facility] [address]     takes Coumadin    Arrhythmia     Disease of blood and blood forming organ     SVT (supraventricular tachycardia) (HCC)        Past Surgical History:   Procedure Laterality Date    ABLATION OF DYSRHYTHMIC FOCUS      ATRIAL ABLATION SURGERY      ATRIAL ABLATION SURGERY      ATRIAL ABLATION SURGERY         Social History     Tobacco Use    Smoking status: Never Smoker    Smokeless tobacco: Never Used   Substance Use Topics    Alcohol use: No    Drug use: No       History reviewed. No pertinent family history. PHYSICAL EXAM:  /70   Pulse 52   Temp 97.2 °F (36.2 °C) (Infrared)   Resp 16   Ht 5' 7\" (1.702 m)   Wt 126 lb 6.4 oz (57.3 kg)   SpO2 98%   BMI 19.80 kg/m²     Physical Exam  Constitutional:       Appearance: Normal appearance.    HENT:      Head: Normocephalic and atraumatic. Right Ear: External ear normal.      Left Ear: External ear normal.      Nose: Nose normal.      Comments: Dry irritated area in left nostril     Mouth/Throat:      Mouth: Mucous membranes are moist.   Eyes:      Conjunctiva/sclera: Conjunctivae normal.   Cardiovascular:      Rate and Rhythm: Normal rate and regular rhythm. Pulses: Normal pulses. Heart sounds: Normal heart sounds. Pulmonary:      Effort: Pulmonary effort is normal.      Breath sounds: Normal breath sounds. Abdominal:      General: Bowel sounds are normal.      Palpations: Abdomen is soft. Musculoskeletal:         General: Normal range of motion. Cervical back: Normal range of motion. Skin:     General: Skin is warm and dry. Neurological:      General: No focal deficit present. Mental Status: She is alert and oriented to person, place, and time. Psychiatric:         Mood and Affect: Mood normal.         Behavior: Behavior normal.             ASSESSMENT & PLAN  Chester Maurer was seen today for 1 month follow-up. Diagnoses and all orders for this visit:    Encounter for screening mammogram for malignant neoplasm of breast  -     [place] DIGITAL SCREEN W OR WO CAD BILATERAL; Future    KIMANI (generalized anxiety disorder)  -     ALPRAZolam (XANAX) 0.25 MG tablet; Take 1 tablet by mouth 3 times daily as needed for Anxiety for up to 30 days. Bloody nose  -     mupirocin (BACTROBAN) 2 % ointment; Apply topically 3 times daily. Eczema, unspecified type  -     clobetasol (TEMOVATE) 0.05 % ointment; Apply topically 2 times daily. Follow up in 3 mos for KIMANI  Call sooner if needed    No follow-ups on file. Chester Maurer received counseling on the following healthy behaviors: nutrition, exercise and medication adherence  Reviewedprior labs and health maintenance. Continue current medications, diet and exercise. Discussed use, benefit, and side effects of prescribed medications.  Barriers to medication compliance addressed. Patient given educationalmaterials - see patient instructions. All patient questions answered. Patient voiced understanding.      Electronically signed by PB Blackwell - CNP, APRN on 11/22/21 at 8:28 AM EST

## 2021-11-29 ENCOUNTER — PATIENT MESSAGE (OUTPATIENT)
Dept: FAMILY MEDICINE CLINIC | Age: 51
End: 2021-11-29

## 2021-11-29 NOTE — TELEPHONE ENCOUNTER
From: Mickey Wilkins  To: Estevan Cervantes  Sent: 11/29/2021 7:57 AM EST  Subject: Covid letter     Good morning I was just wondering if you could please get that letter for me so I could provide it to my work as soon as possible that I cannot receive the covid vaccine due to my health. Radha Davalos

## 2021-11-29 NOTE — LETTER
[address] [facility]  [address] [place] [address]  Phone: [phone]  Fax: [phone]    PB Pereira CNP        November 29, 2021     Patient: Patra Shone   YOB: 1970   Date of Visit: 11/29/2021       To Whom It May Concern: It is my medical opinion that John Contreras should not have a COVID vaccine due to severe reactions to other vaccines. If you have any questions or concerns, please don't hesitate to call.     Sincerely,        PB Pereira CNP

## 2021-12-08 ENCOUNTER — TELEPHONE (OUTPATIENT)
Dept: FAMILY MEDICINE CLINIC | Age: 51
End: 2021-12-08

## 2021-12-08 NOTE — TELEPHONE ENCOUNTER
----- Message from Chapis Peck sent at 12/8/2021 12:53 PM EST -----  Subject: Message to Provider    QUESTIONS  Information for Provider? please have cristhian call patient asap   ---------------------------------------------------------------------------  --------------  CALL BACK INFO  What is the best way for the office to contact you? OK to leave message on   voicemail  Preferred Call Back Phone Number? [phone]  ---------------------------------------------------------------------------  --------------  SCRIPT ANSWERS  Relationship to Patient?  Self

## 2021-12-08 NOTE — TELEPHONE ENCOUNTER
GORGE    Pt called to let you know she is currently @ ER in [facility], her magnesium is low, her INR is 8 , she thinks they are going to do a CT head , her b/p is 180/99

## 2021-12-17 ENCOUNTER — TELEPHONE (OUTPATIENT)
Dept: FAMILY MEDICINE CLINIC | Age: 51
End: 2021-12-17

## 2021-12-17 NOTE — TELEPHONE ENCOUNTER
----- Message from Aguila Araujo sent at 12/17/2021  8:11 AM EST -----  Subject: Message to Provider    QUESTIONS  Information for Provider? Please have Marie call me asap- tried this morning   but no one is answering  ---------------------------------------------------------------------------  --------------  CALL BACK INFO  What is the best way for the office to contact you? OK to leave message on   voicemail  Preferred Call Back Phone Number? [phone]  ---------------------------------------------------------------------------  --------------  SCRIPT ANSWERS  Relationship to Patient?  Self

## 2021-12-21 ENCOUNTER — OFFICE VISIT (OUTPATIENT)
Dept: FAMILY MEDICINE CLINIC | Age: 51
End: 2021-12-21
Payer: MEDICARE

## 2021-12-21 VITALS
BODY MASS INDEX: 19.97 KG/M2 | OXYGEN SATURATION: 98 % | WEIGHT: 127.2 LBS | DIASTOLIC BLOOD PRESSURE: 72 MMHG | HEART RATE: 48 BPM | RESPIRATION RATE: 16 BRPM | HEIGHT: 67 IN | SYSTOLIC BLOOD PRESSURE: 104 MMHG | TEMPERATURE: 97.1 F

## 2021-12-21 DIAGNOSIS — D68.61 ANTI-PHOSPHOLIPID ANTIBODY SYNDROME (HCC): ICD-10-CM

## 2021-12-21 DIAGNOSIS — F41.1 GAD (GENERALIZED ANXIETY DISORDER): ICD-10-CM

## 2021-12-21 DIAGNOSIS — E83.42 HYPOMAGNESEMIA: ICD-10-CM

## 2021-12-21 DIAGNOSIS — I10 HYPERTENSION, UNSPECIFIED TYPE: Primary | ICD-10-CM

## 2021-12-21 PROCEDURE — G8420 CALC BMI NORM PARAMETERS: HCPCS | Performed by: NURSE PRACTITIONER

## 2021-12-21 PROCEDURE — G8484 FLU IMMUNIZE NO ADMIN: HCPCS | Performed by: NURSE PRACTITIONER

## 2021-12-21 PROCEDURE — 99214 OFFICE O/P EST MOD 30 MIN: CPT | Performed by: NURSE PRACTITIONER

## 2021-12-21 PROCEDURE — 1036F TOBACCO NON-USER: CPT | Performed by: NURSE PRACTITIONER

## 2021-12-21 PROCEDURE — 3017F COLORECTAL CA SCREEN DOC REV: CPT | Performed by: NURSE PRACTITIONER

## 2021-12-21 PROCEDURE — G8427 DOCREV CUR MEDS BY ELIG CLIN: HCPCS | Performed by: NURSE PRACTITIONER

## 2021-12-21 RX ORDER — ALPRAZOLAM 0.25 MG/1
0.25 TABLET ORAL 3 TIMES DAILY PRN
Qty: 90 TABLET | Refills: 0 | Status: SHIPPED | OUTPATIENT
Start: 2021-12-21 | End: 2022-01-18 | Stop reason: SDUPTHER

## 2021-12-21 RX ORDER — WARFARIN SODIUM 7.5 MG/1
7.5 TABLET ORAL DAILY
Qty: 30 TABLET | Refills: 3 | Status: SHIPPED | OUTPATIENT
Start: 2021-12-21 | End: 2022-05-12

## 2021-12-21 NOTE — PROGRESS NOTES
[address] MEDICINE [address] JOSETTESrikanth Madrigal Giuseppe. [facility]  Dept: [phone]  Loc: [phone]  Visit Date: 12/21/2021      Chief Complaint:   Armando Davila is a 46 y.o. female thatpresents for Blood Pressure Check (had high BP in ER 2 weeks ago - would like an MRI to make sure everything is okay)    HPI:  Blood pressure was elevated, 180/100s  Was tachy  Given magnesium,  N/T subsided, BP returned to normal  INR was >7  Repeated 2 weeks ago, 2.2  Denies chest pain, SOB. Lightheadedness  Followed up with Dr. Marcelino Patel  She ordered an Echo, had that yesterday  In the interim spoke with her Neurologist who suggested we order a MRI to make sure everything is ok. Pt had N/T of the face which subsided with treatment for her hypomagnesemia and HTN, has not had it since    The patient comes in alone today. History obtained from pt and medical records. I have reviewed the patient's past medical history, past surgical history, allergies,medications, social and family history and I have made updates where appropriate. Past Medical History:   Diagnosis Date    Acute CVA (cerebrovascular accident) ([facility] [address] 1998    Dr. Pan Purchase antibody syndrome ([facility] [address]     takes Coumadin    Arrhythmia     Disease of blood and blood forming organ     SVT (supraventricular tachycardia) (HCC)        Past Surgical History:   Procedure Laterality Date    ABLATION OF DYSRHYTHMIC FOCUS      ATRIAL ABLATION SURGERY      ATRIAL ABLATION SURGERY      ATRIAL ABLATION SURGERY         Social History     Tobacco Use    Smoking status: Never Smoker    Smokeless tobacco: Never Used   Substance Use Topics    Alcohol use: No    Drug use: No       History reviewed. No pertinent family history.     PHYSICAL EXAM:  /72   Pulse (!) 48   Temp 97.1 °F (36.2 °C) (Infrared)   Resp 16   Ht 5' 7\" (1.702 m)   Wt 127 lb 3.2 oz (57.7 kg)   SpO2 98%   BMI 19.92 kg/m²     Physical Exam  Constitutional:       Appearance: Normal appearance. HENT:      Head: Normocephalic and atraumatic. Right Ear: External ear normal.      Left Ear: External ear normal.      Nose: Nose normal.      Mouth/Throat:      Mouth: Mucous membranes are moist.   Eyes:      Conjunctiva/sclera: Conjunctivae normal.   Cardiovascular:      Rate and Rhythm: Normal rate and regular rhythm. Pulses: Normal pulses. Heart sounds: Normal heart sounds. Pulmonary:      Effort: Pulmonary effort is normal.      Breath sounds: Normal breath sounds. Abdominal:      General: Bowel sounds are normal.      Palpations: Abdomen is soft. Musculoskeletal:         General: Normal range of motion. Cervical back: Normal range of motion. Skin:     General: Skin is warm and dry. Neurological:      General: No focal deficit present. Mental Status: She is alert and oriented to person, place, and time. Psychiatric:         Mood and Affect: Mood normal.         Behavior: Behavior normal.         ASSESSMENT & PLAN  [facility] was seen today for blood pressure check. Diagnoses and all orders for this visit:    Hypertension, unspecified type  -     CBC Auto Differential; Future  -     Comprehensive Metabolic Panel; Future  -     Protime-INR; Future    Anti-phospholipid antibody syndrome ([facility] [address]  -     Protime-INR; Future  -     warfarin (COUMADIN) 7.5 MG tablet; Take 1 tablet by mouth daily    Hypomagnesemia  -     Magnesium; Future    KIMANI (generalized anxiety disorder)  -     ALPRAZolam (XANAX) 0.25 MG tablet; Take 1 tablet by mouth 3 times daily as needed for Anxiety for up to 30 days. BP stable, been stable since last ER visit  Awaiting echo results  Needs repeat labs today    No follow-ups on file. [facility] received counseling on the following healthy behaviors: nutrition, exercise and medication adherence  Reviewedprior labs and health maintenance. Continue current medications, diet and exercise.   Discussed use, benefit, and side effects of prescribed medications. Barriers to medication compliance addressed. Patient given educationalmaterials - see patient instructions. All patient questions answered. Patient voiced understanding.      Electronically signed by PB Chu - CNP, APRN on 12/21/21 at 11:19 AM EST

## 2021-12-27 ENCOUNTER — TELEPHONE (OUTPATIENT)
Dept: FAMILY MEDICINE CLINIC | Age: 51
End: 2021-12-27

## 2021-12-27 NOTE — TELEPHONE ENCOUNTER
----- Message from PB Haddad CNP sent at 12/23/2021  3:11 PM EST -----  Labs were stable. INR was 2.09. Continue current Coumadin dose. Recheck in 2 weeks.   Electronically signed by PB Haddad CNP on 12/23/2021 at 3:10 PM

## 2022-01-03 ENCOUNTER — NURSE ONLY (OUTPATIENT)
Dept: LAB | Age: 52
End: 2022-01-03

## 2022-01-03 ENCOUNTER — PATIENT MESSAGE (OUTPATIENT)
Dept: FAMILY MEDICINE CLINIC | Age: 52
End: 2022-01-03

## 2022-01-03 DIAGNOSIS — D68.61 ANTI-PHOSPHOLIPID ANTIBODY SYNDROME (HCC): ICD-10-CM

## 2022-01-03 DIAGNOSIS — E83.42 HYPOMAGNESEMIA: ICD-10-CM

## 2022-01-03 DIAGNOSIS — E83.42 HYPOMAGNESEMIA: Primary | ICD-10-CM

## 2022-01-03 DIAGNOSIS — D68.61 ANTI-PHOSPHOLIPID ANTIBODY SYNDROME (HCC): Primary | ICD-10-CM

## 2022-01-03 LAB
ALBUMIN SERPL-MCNC: 4.6 G/DL (ref 3.5–5.1)
ALP BLD-CCNC: 90 U/L (ref 38–126)
ALT SERPL-CCNC: 14 U/L (ref 11–66)
ANION GAP SERPL CALCULATED.3IONS-SCNC: 12 MEQ/L (ref 8–16)
AST SERPL-CCNC: 18 U/L (ref 5–40)
BILIRUB SERPL-MCNC: 0.2 MG/DL (ref 0.3–1.2)
BUN BLDV-MCNC: 20 MG/DL (ref 7–22)
CALCIUM SERPL-MCNC: 9.9 MG/DL (ref 8.5–10.5)
CHLORIDE BLD-SCNC: 102 MEQ/L (ref 98–111)
CO2: 25 MEQ/L (ref 23–33)
CREAT SERPL-MCNC: 1.1 MG/DL (ref 0.4–1.2)
GFR SERPL CREATININE-BSD FRML MDRD: 52 ML/MIN/1.73M2
GLUCOSE BLD-MCNC: 91 MG/DL (ref 70–108)
INR BLD: 1.9 (ref 0.85–1.13)
POTASSIUM SERPL-SCNC: 4.4 MEQ/L (ref 3.5–5.2)
SODIUM BLD-SCNC: 139 MEQ/L (ref 135–145)
TOTAL PROTEIN: 7.1 G/DL (ref 6.1–8)

## 2022-01-03 NOTE — TELEPHONE ENCOUNTER
From: Esme Ariza  To: Fermin Pearson  Sent: 1/3/2022 10:15 AM EST  Subject: Labs    Good morning I was off work today I was wondering if I could get a repeat of my magnesium and potassium and my INR I usually go to that lab right across from [facility] if it would be able to be fax there

## 2022-01-04 ENCOUNTER — TELEPHONE (OUTPATIENT)
Dept: FAMILY MEDICINE CLINIC | Age: 52
End: 2022-01-04

## 2022-01-04 DIAGNOSIS — D68.61 ANTI-PHOSPHOLIPID ANTIBODY SYNDROME (HCC): Primary | ICD-10-CM

## 2022-01-04 DIAGNOSIS — E83.42 HYPOMAGNESEMIA: Primary | ICD-10-CM

## 2022-01-04 LAB — MAGNESIUM: 2 MG/DL (ref 1.6–2.4)

## 2022-01-04 NOTE — TELEPHONE ENCOUNTER
----- Message from PB Garcia CNP sent at 1/4/2022 11:45 AM EST -----  Labs looked good, including the Mag. INR was 1.9. No change in dose, repeat in 1 week. Order placed.   Electronically signed by PB Garcia CNP on 1/4/2022 at 11:45 AM

## 2022-01-05 NOTE — TELEPHONE ENCOUNTER
Order placed  She can check it every couple mos  Electronically signed by PB Dueñas CNP on 1/5/2022 at 12:47 PM

## 2022-01-06 ENCOUNTER — PATIENT MESSAGE (OUTPATIENT)
Dept: FAMILY MEDICINE CLINIC | Age: 52
End: 2022-01-06

## 2022-01-06 DIAGNOSIS — F41.1 GAD (GENERALIZED ANXIETY DISORDER): ICD-10-CM

## 2022-01-06 DIAGNOSIS — D68.61 ANTI-PHOSPHOLIPID ANTIBODY SYNDROME (HCC): Primary | ICD-10-CM

## 2022-01-07 NOTE — TELEPHONE ENCOUNTER
From: MARIA DEL CARMEN HARRISON  To: Estrella Magallanes  Sent: 1/6/2022 1:30 PM EST  Subject: Magneisum order    Pablo Tapia placed a standing order to get your magnesium checked. She said you can get it checked every couple of months.     Thank you,  Bhupendra Atwood LPN

## 2022-01-07 NOTE — TELEPHONE ENCOUNTER
Thank you so much I appreciate that I need a standing order for my coumadin level?  So I can at least check it once a week until we get it where it needs to be if she thinks that's okay thank you so much

## 2022-01-10 NOTE — TELEPHONE ENCOUNTER
Order for standing INR placed  Also placed order for home INR machine  Electronically signed by PB Stanley CNP on 1/10/2022 at 1:04 PM

## 2022-01-17 ENCOUNTER — NURSE ONLY (OUTPATIENT)
Dept: LAB | Age: 52
End: 2022-01-17

## 2022-01-17 DIAGNOSIS — E83.42 HYPOMAGNESEMIA: ICD-10-CM

## 2022-01-17 DIAGNOSIS — D68.61 ANTI-PHOSPHOLIPID ANTIBODY SYNDROME (HCC): ICD-10-CM

## 2022-01-17 LAB
INR BLD: 1.82 (ref 0.85–1.13)
MAGNESIUM: 1.9 MG/DL (ref 1.6–2.4)

## 2022-01-18 ENCOUNTER — TELEPHONE (OUTPATIENT)
Dept: FAMILY MEDICINE CLINIC | Age: 52
End: 2022-01-18

## 2022-01-18 DIAGNOSIS — D68.61 ANTI-PHOSPHOLIPID ANTIBODY SYNDROME (HCC): ICD-10-CM

## 2022-01-18 RX ORDER — ALPRAZOLAM 0.25 MG/1
0.25 TABLET ORAL 3 TIMES DAILY PRN
Qty: 90 TABLET | Refills: 0 | Status: SHIPPED | OUTPATIENT
Start: 2022-01-18 | End: 2022-02-16 | Stop reason: SDUPTHER

## 2022-01-18 NOTE — TELEPHONE ENCOUNTER
----- Message from Kalie Jean Baptiste, DO sent at 1/18/2022  1:37 PM EST -----  INR level was a little low at 1.8, what is her current coumadin regimen?

## 2022-01-19 RX ORDER — WARFARIN SODIUM 5 MG/1
TABLET ORAL
Qty: 30 TABLET | Refills: 3 | Status: SHIPPED | OUTPATIENT
Start: 2022-01-19 | End: 2022-05-16

## 2022-01-19 NOTE — TELEPHONE ENCOUNTER
Let patient know the change to to her coumadin. She wanted to know if we can call in 5mg tablets to her pharmacy.

## 2022-01-19 NOTE — TELEPHONE ENCOUNTER
When I spoke to the patient she stated she started out taking the 7.5 mg tablets and when her INR was high she backed herself down to 5 mg.

## 2022-01-19 NOTE — TELEPHONE ENCOUNTER
Please confirm the dosing w patient. We sent 7.5mg pills to the pharmacy in December. Is she taking 5mg daily or 7.5mg daily? Yes

## 2022-01-31 ENCOUNTER — PATIENT MESSAGE (OUTPATIENT)
Dept: FAMILY MEDICINE CLINIC | Age: 52
End: 2022-01-31

## 2022-01-31 NOTE — TELEPHONE ENCOUNTER
From: Courtney Johnston  To: Harvey Moffett  Sent: 1/31/2022 9:48 AM EST  Subject: Potassium     Sorry to bother you again Diane Crowe also I need potassium they said it was low but no one wrote me a prescription I think it was three point one this is all from Adrian Michaud

## 2022-02-01 ENCOUNTER — TELEPHONE (OUTPATIENT)
Dept: FAMILY MEDICINE CLINIC | Age: 52
End: 2022-02-01

## 2022-02-01 NOTE — TELEPHONE ENCOUNTER
----- Message from Evy Vasquez sent at 2/1/2022  8:35 AM EST -----  Subject: Message to Provider    QUESTIONS  Information for Provider? Reynold states she wanted to speak with Lexa López only. I was unable to reach the office after calling multiple times. If   possible, please have Marie return the phone call to Reynold. ---------------------------------------------------------------------------  --------------  Marc PLASENCIA  What is the best way for the office to contact you? OK to leave message on   voicemail  Preferred Call Back Phone Number? [phone]  ---------------------------------------------------------------------------  --------------  SCRIPT ANSWERS  Relationship to Patient?  Self

## 2022-02-02 ENCOUNTER — NURSE ONLY (OUTPATIENT)
Dept: LAB | Age: 52
End: 2022-02-02

## 2022-02-02 DIAGNOSIS — D68.61 ANTI-PHOSPHOLIPID ANTIBODY SYNDROME (HCC): ICD-10-CM

## 2022-02-02 LAB — INR BLD: 1.88 (ref 0.85–1.13)

## 2022-02-03 DIAGNOSIS — D68.61 ANTI-PHOSPHOLIPID ANTIBODY SYNDROME (HCC): Primary | ICD-10-CM

## 2022-02-04 ENCOUNTER — TELEPHONE (OUTPATIENT)
Dept: FAMILY MEDICINE CLINIC | Age: 52
End: 2022-02-04

## 2022-02-04 NOTE — TELEPHONE ENCOUNTER
----- Message from PB Robles CNP sent at 2/3/2022 10:36 AM EST -----  INR not changed much. Take 7.5 mg Mon and Thurs, 5 mg all other days. Recheck INR in 1 week. Order placed.    Electronically signed by PB Robles CNP on 2/3/2022 at 10:34 AM

## 2022-02-04 NOTE — TELEPHONE ENCOUNTER
Patient informed understanding voiced. Patient wanted to let [facility] know her blood pressure has been elevated recetnly and she has an appointment with Dr. Chasity Asher on Wednesday regarding this.

## 2022-02-11 ENCOUNTER — TELEPHONE (OUTPATIENT)
Dept: FAMILY MEDICINE CLINIC | Age: 52
End: 2022-02-11

## 2022-02-11 ENCOUNTER — NURSE ONLY (OUTPATIENT)
Dept: LAB | Age: 52
End: 2022-02-11

## 2022-02-11 DIAGNOSIS — D68.61 ANTI-PHOSPHOLIPID ANTIBODY SYNDROME (HCC): ICD-10-CM

## 2022-02-11 LAB
CHOLESTEROL, TOTAL: 248 MG/DL (ref 100–199)
HDLC SERPL-MCNC: 77 MG/DL
INR BLD: 6.61 (ref 0.85–1.13)
LDL CHOLESTEROL CALCULATED: 152 MG/DL
TRIGL SERPL-MCNC: 96 MG/DL (ref 0–199)
TSH SERPL DL<=0.05 MIU/L-ACNC: 2.11 UIU/ML (ref 0.4–4.2)

## 2022-02-16 ENCOUNTER — PATIENT MESSAGE (OUTPATIENT)
Dept: FAMILY MEDICINE CLINIC | Age: 52
End: 2022-02-16

## 2022-02-16 DIAGNOSIS — D68.61 ANTI-PHOSPHOLIPID ANTIBODY SYNDROME (HCC): Primary | ICD-10-CM

## 2022-02-16 DIAGNOSIS — F41.1 GAD (GENERALIZED ANXIETY DISORDER): ICD-10-CM

## 2022-02-16 DIAGNOSIS — I10 HYPERTENSION, UNSPECIFIED TYPE: ICD-10-CM

## 2022-02-16 RX ORDER — ALPRAZOLAM 0.25 MG/1
0.25 TABLET ORAL 3 TIMES DAILY PRN
Qty: 90 TABLET | Refills: 0 | Status: SHIPPED | OUTPATIENT
Start: 2022-02-16 | End: 2022-03-18 | Stop reason: SDUPTHER

## 2022-02-16 NOTE — TELEPHONE ENCOUNTER
From: Ever Licona  To: Caryle Sprang  Sent: 2/16/2022 11:48 AM EST  Subject: Medications     Good morning Surinder Lo I just wanted to touch base with you I was in the hospital in [facility] from Friday until Monday they did the cardiac Cath because my enzymes are elevated Friday dr. Roxy Favre I said I am having coronary artery spasms which is causing my blood pressure to go up he still has me on Verapamil 240 b i d. And then he has me on 25 mg of Atenolol once a day he felt that the other dose that doctor Racheal Smallwood was giving me was way too much that my pulse was going into the thirties when I was sleeping and making things worse I'm also carrying nitroglycerin if my blood pressure goes up and I am to take one along with a Clonidine. I wanted to talk to you about my Xanax I was taking it every 6 hours as you directed me to it will need refilled sooner this time and wanted to clarify that with you so you didn't think I was overtaking I will need a refill by Friday and I would like to go back to taking it as we had it before three times a day if that's okay with you I'm going back   also to my therapist. I will make an appointment to come in also and see you I'm getting a PT INR done today.

## 2022-02-17 LAB — METANEPHRINES PLASMA: NORMAL

## 2022-02-28 ENCOUNTER — NURSE ONLY (OUTPATIENT)
Dept: LAB | Age: 52
End: 2022-02-28

## 2022-02-28 DIAGNOSIS — D68.61 ANTI-PHOSPHOLIPID ANTIBODY SYNDROME (HCC): ICD-10-CM

## 2022-02-28 LAB — INR BLD: 1.76 (ref 0.85–1.13)

## 2022-03-02 ENCOUNTER — TELEPHONE (OUTPATIENT)
Dept: FAMILY MEDICINE CLINIC | Age: 52
End: 2022-03-02

## 2022-03-02 NOTE — TELEPHONE ENCOUNTER
----- Message from PB Aparicio CNP sent at 2/28/2022  4:02 PM EST -----  INR was 1.76. Are you still taking Coumadin 5 mg daily?

## 2022-03-08 ENCOUNTER — NURSE ONLY (OUTPATIENT)
Dept: LAB | Age: 52
End: 2022-03-08

## 2022-03-08 DIAGNOSIS — D68.61 ANTI-PHOSPHOLIPID ANTIBODY SYNDROME (HCC): ICD-10-CM

## 2022-03-08 LAB — INR BLD: 2.4 (ref 0.85–1.13)

## 2022-03-08 NOTE — TELEPHONE ENCOUNTER
Pt informed and verbalized understanding.     Pt states Dr Katey Henson increased coumadin  7mg on thurs and sat  5mg all other days    Prior to hosp admit

## 2022-03-09 ENCOUNTER — TELEPHONE (OUTPATIENT)
Dept: FAMILY MEDICINE CLINIC | Age: 52
End: 2022-03-09

## 2022-03-09 NOTE — TELEPHONE ENCOUNTER
----- Message from PB Garay CNP sent at 3/8/2022  4:29 PM EST -----  INR 2.40.  no change in Coumadin. Recheck in 1 week.   Electronically signed by PB Garay CNP on 3/8/2022 at 4:28 PM

## 2022-03-14 ENCOUNTER — TELEPHONE (OUTPATIENT)
Dept: FAMILY MEDICINE CLINIC | Age: 52
End: 2022-03-14

## 2022-03-14 NOTE — TELEPHONE ENCOUNTER
Patient came in with letter from Dr. Daiana Desir stating she has been advised not to get vaccine. Forms faxed to Dr. Desi Fox office. Patient informed to call Dr. Desi Fox office.

## 2022-03-14 NOTE — TELEPHONE ENCOUNTER
----- Message from Sarah Salma sent at 3/14/2022  9:50 AM EDT -----  Subject: Message to Provider    QUESTIONS  Information for Provider? Patient is calling in and she is wanting to know   if Gilberto Wright can sign a waiver for her so she does not have to get   the COVID vaccine. Patient is stating that she does have the form with her   already. Please call her back as soon as possible. Thank you.  ---------------------------------------------------------------------------  --------------  CALL BACK INFO  What is the best way for the office to contact you? OK to leave message on   voicemail  Preferred Call Back Phone Number? [phone]  ---------------------------------------------------------------------------  --------------  SCRIPT ANSWERS  Relationship to Patient?  Self

## 2022-03-18 RX ORDER — ALPRAZOLAM 0.25 MG/1
0.25 TABLET ORAL 3 TIMES DAILY PRN
Qty: 90 TABLET | Refills: 0 | Status: SHIPPED | OUTPATIENT
Start: 2022-03-18 | End: 2022-04-18 | Stop reason: SDUPTHER

## 2022-03-24 ENCOUNTER — NURSE ONLY (OUTPATIENT)
Dept: LAB | Age: 52
End: 2022-03-24

## 2022-03-24 DIAGNOSIS — E83.42 HYPOMAGNESEMIA: ICD-10-CM

## 2022-03-24 DIAGNOSIS — D68.61 ANTI-PHOSPHOLIPID ANTIBODY SYNDROME (HCC): ICD-10-CM

## 2022-03-24 DIAGNOSIS — I10 HYPERTENSION, UNSPECIFIED TYPE: ICD-10-CM

## 2022-03-24 LAB
ALBUMIN SERPL-MCNC: 4.4 G/DL (ref 3.5–5.1)
ALP BLD-CCNC: 97 U/L (ref 38–126)
ALT SERPL-CCNC: 14 U/L (ref 11–66)
ANION GAP SERPL CALCULATED.3IONS-SCNC: 13 MEQ/L (ref 8–16)
AST SERPL-CCNC: 20 U/L (ref 5–40)
BILIRUB SERPL-MCNC: < 0.2 MG/DL (ref 0.3–1.2)
BUN BLDV-MCNC: 17 MG/DL (ref 7–22)
CALCIUM SERPL-MCNC: 9.8 MG/DL (ref 8.5–10.5)
CHLORIDE BLD-SCNC: 103 MEQ/L (ref 98–111)
CO2: 26 MEQ/L (ref 23–33)
CREAT SERPL-MCNC: 0.7 MG/DL (ref 0.4–1.2)
GFR SERPL CREATININE-BSD FRML MDRD: 88 ML/MIN/1.73M2
GLUCOSE BLD-MCNC: 71 MG/DL (ref 70–108)
INR BLD: 1.55 (ref 0.85–1.13)
MAGNESIUM: 2.1 MG/DL (ref 1.6–2.4)
POTASSIUM SERPL-SCNC: 4.5 MEQ/L (ref 3.5–5.2)
SODIUM BLD-SCNC: 142 MEQ/L (ref 135–145)
TOTAL PROTEIN: 6.9 G/DL (ref 6.1–8)

## 2022-03-30 ENCOUNTER — TELEPHONE (OUTPATIENT)
Dept: FAMILY MEDICINE CLINIC | Age: 52
End: 2022-03-30

## 2022-03-30 DIAGNOSIS — D68.61 ANTI-PHOSPHOLIPID ANTIBODY SYNDROME (HCC): Primary | ICD-10-CM

## 2022-03-30 NOTE — TELEPHONE ENCOUNTER
----- Message from PB Boudreuax CNP sent at 3/29/2022 11:14 AM EDT -----  INR came back at 1.55. Is she still taking Coumadin 7 mg Thurs and Sat, 5 mg all other days?

## 2022-03-30 NOTE — TELEPHONE ENCOUNTER
Patient stated she spoke with a retired neurologist and he advised her to alternate taking 5 mg and 7.5 mg. Patient stated she was nervous because she was not notified that day of her INR results and when she saw that it was low she reached out to him. Patient is going to get her INR drawn again tomorrow (3/31) Patient was taking 7 mg Thurs & Sat and 5 mg all over days (along with a 325 aspirin). Patient also wants a home INR machine.

## 2022-03-31 ENCOUNTER — NURSE ONLY (OUTPATIENT)
Dept: LAB | Age: 52
End: 2022-03-31

## 2022-03-31 ENCOUNTER — TELEPHONE (OUTPATIENT)
Dept: FAMILY MEDICINE CLINIC | Age: 52
End: 2022-03-31

## 2022-03-31 DIAGNOSIS — D68.61 ANTI-PHOSPHOLIPID ANTIBODY SYNDROME (HCC): Primary | ICD-10-CM

## 2022-03-31 DIAGNOSIS — D68.61 ANTI-PHOSPHOLIPID ANTIBODY SYNDROME (HCC): ICD-10-CM

## 2022-03-31 LAB — INR BLD: 1.69 (ref 0.85–1.13)

## 2022-03-31 NOTE — TELEPHONE ENCOUNTER
Can we check on her home inr machine  I placed a new order and printed it off and signed it  Electronically signed by PB Adair CNP on 3/31/2022 at 2:34 PM

## 2022-03-31 NOTE — TELEPHONE ENCOUNTER
----- Message from PB Cope CNP sent at 3/31/2022  1:57 PM EDT -----  INR was 1.69. Take Coumadin 7.5 mg Tues, Thurs, Sat, 5 mg all other days. Recheck in 1 week. Order placed. Also we really need to be in the loop with how she is taking her Coumadin if we are going to continue to manage it. She needs to decide if she wants us, Cardiology, or someone else managing it. Since we aren't all in the same system, I can't see when Cardiology makes changes, and they can't see what I change, etc.  I'm okay with them managing it going forward if she prefers that. Its just too high risk of a med for me to be prescribing without knowing about dose changes.

## 2022-03-31 NOTE — TELEPHONE ENCOUNTER
Left message on answering machine requesting pt to call back at earliest convenience. Normal for race

## 2022-04-04 NOTE — TELEPHONE ENCOUNTER
Called David information given to them, order faxed to [address]. Stated they will call patient and set up for Home INR machine.

## 2022-04-06 ENCOUNTER — TELEPHONE (OUTPATIENT)
Dept: FAMILY MEDICINE CLINIC | Age: 52
End: 2022-04-06

## 2022-04-06 ENCOUNTER — TELEPHONE (OUTPATIENT)
Dept: PHARMACY | Age: 52
End: 2022-04-06

## 2022-04-06 DIAGNOSIS — R30.0 DYSURIA: Primary | ICD-10-CM

## 2022-04-06 NOTE — TELEPHONE ENCOUNTER
Received referral from Court Hatfield CNP. Called patient who was confused as to why [address] received a referral for management of her Coumadin. She stated that she was under the understanding that Court Hatfield CNP was going to dose and follow. She also reported that they were trying to get her a home INR machine. Called and spoke Rosina Hein RN at [facility] regarding all of this. She spoke with provider as per her original understanding was that Court Hatfield CNP was going to dose the Coumadin for this patient. It appears as though Court Hatfield CNP would now like [facility] managing due to the complexity of the patient. Rosina Hein stated that she would call the patient to let her know all of this. Also, let Rosina Hein know that we would not be following INRs for home INR machines. If patient would get a home INR machine, then the provider would then need to take over dosing the Coumadin.      Shantal Farrell, BritniD, BCPS  4/6/2022  4:01 PM

## 2022-04-06 NOTE — TELEPHONE ENCOUNTER
Pt c/o burning,freq,urgency since last Friday  sxs started last week,   Pt would like order to go to lab

## 2022-04-06 NOTE — TELEPHONE ENCOUNTER
Pt informed and verbalized understanding.     Pt wants you to handle the coumadin  Pt will get labs done fri am

## 2022-04-07 RX ORDER — CEPHALEXIN 250 MG/1
500 CAPSULE ORAL 4 TIMES DAILY
Qty: 40 CAPSULE | Refills: 0 | Status: SHIPPED | OUTPATIENT
Start: 2022-04-07 | End: 2022-04-12

## 2022-04-07 RX ORDER — NITROFURANTOIN 25; 75 MG/1; MG/1
100 CAPSULE ORAL 2 TIMES DAILY
Qty: 10 CAPSULE | Refills: 0 | Status: SHIPPED | OUTPATIENT
Start: 2022-04-07 | End: 2022-04-07 | Stop reason: SINTOL

## 2022-04-07 NOTE — TELEPHONE ENCOUNTER
Patient informed stated she is allergic to Macrobid. Patient stated she usually takes Cephalexin for UTI and  she will get the urine done tomorrow because she is at work.

## 2022-04-07 NOTE — TELEPHONE ENCOUNTER
Lab order placed  Will send in [address]  Don't start this until she has given her urine sample  Electronically signed by PB Degroot CNP on 4/7/2022 at 8:05 AM

## 2022-04-08 ENCOUNTER — HOSPITAL ENCOUNTER (OUTPATIENT)
Dept: PHARMACY | Age: 52
Setting detail: THERAPIES SERIES
Discharge: HOME | End: 2022-04-08
Payer: MEDICARE

## 2022-04-08 DIAGNOSIS — D68.61 ANTI-PHOSPHOLIPID ANTIBODY SYNDROME (HCC): Primary | ICD-10-CM

## 2022-04-08 DIAGNOSIS — Z51.81 ENCOUNTER FOR THERAPEUTIC DRUG MONITORING: ICD-10-CM

## 2022-04-08 DIAGNOSIS — R00.0 TACHYCARDIA: ICD-10-CM

## 2022-04-08 DIAGNOSIS — Z79.01 ANTICOAGULATED ON WARFARIN: ICD-10-CM

## 2022-04-08 LAB
INR BLD: 1.9 (ref 0.85–1.13)
POC INR: 1.9 (ref 0.8–1.2)

## 2022-04-08 PROCEDURE — 36416 COLLJ CAPILLARY BLOOD SPEC: CPT | Performed by: PHARMACIST

## 2022-04-08 PROCEDURE — 85610 PROTHROMBIN TIME: CPT

## 2022-04-08 PROCEDURE — 99213 OFFICE O/P EST LOW 20 MIN: CPT | Performed by: PHARMACIST

## 2022-04-08 PROCEDURE — 85610 PROTHROMBIN TIME: CPT | Performed by: PHARMACIST

## 2022-04-08 NOTE — TELEPHONE ENCOUNTER
Pt called back. She states that she needs an INR today. Pt relays history of labile INRs. Offered pt appt this am, as she states she will go to lab regardless to use standing order from PCP.

## 2022-04-08 NOTE — TELEPHONE ENCOUNTER
Referred to [facility]) for Coumadin management by Nargis Peters CNP for antiphospholipid antibody syndrome, goal INR 2-3, therapy duration indefinite, initial referral 4/6/22. Called pt, LM requesting call back.

## 2022-04-08 NOTE — PROGRESS NOTES
Medication Management [address] [facility]  [address] [place] [address]  [phone] (phone)  [phone] (fax)     Patient presents to the Anticoagulation clinic today for assessment and initial dosing of warfarin. Patient has a diagnosis of Antiphospholipid Syndrome and has been placed on Coumadin with a goal INR 2-3. Pt started Coumadin 24 years ago. Had previously been managed by PCP, now referred to [facility]. Shanta Baker was given full education today in the clinic including written materials, supplemental oral instructions, and all questions were answered. Specifically, Staci White was instructed to notfiy the Anticoagulation clinic immediately if there is any unusual bleeding, such as throwing or coughing up blood, bleeding from the nose, mouth, ears, or pink-red tinge in the urine or if the stools contain bright red blood or are black and tarry. In addition, Staci White was informed to notify the clinic about any and all medicine changes, including prescriptions, over-the-counter or nonprescription medicines, vitamins, herbs, supplements, creams, rubs, eye or ear drops, and injections, whether to be used short- or long-term, within 24 hours. The patient was also instructed to let all other physicians and pharmacists know that warfarin was started as a double-check against drug interactions. The patient was further instructed on the effects of vitamin K containing foods on warfarin and the importance of consistency was stressed. Staci White was also advised to avoid large amounts of alcohol, grapefruit juice or cranberry juice while on warfarin. HPI: CVA in 1996. Diagnosed with Antiphospholipid Syndrome. Pt states that she her lab was negative for lupus anticoagulant.      Medication changes: No changes - might consider a daily ASA  Tablet strength per patient: 5mg,  7.5mg   Patient reported dosing regimen over last 1 week: updated on track board  Missed doses in the last 1-2 weeks: took 5mg last night instead of 7.5mg   Extra doses in the last 1-2 weeks: no  Any problems with bleeding/bruising? No  Any recent falls? No   Any signs or symptoms of DVT/PE or stroke? No - has some residual symptoms from previous CVA, typically numbness around mouth and arm, can flare up and come and go, relates this to subtherapeutic INRs, neurologist aware. Alcohol use: never  Tobacco use: never  Diet changes as follows: No changes   Green leafy intake: likes these things, keeps consistent   Grapefruit/cranberry juice: avoids  Upcoming surgeries or procedures: none        Assessment  Lab Results   Component Value Date    INR 1.90 (H) 04/08/2022    INR 1.90 (H) 04/08/2022    INR 1.69 (H) 03/31/2022     INR subtherapeutic   Recent Labs     04/08/22  1136   INR 1.90*     Checked venous INR and POC INR, both = 1.90    Pt relays that when she feels residual numbness from CVA when INR is subtherapeutic. States her neurologist is aware, this is a longstanding issue. Pt shares that her neurologist would like her to take Aspirin 325mg daily. Discussed with pt. Advised full stomach and to monitor for signs of GI bleed, pt voiced understanding. Pt states that she will occasionally take Aspirin 325mg daily prn when she is having issues with residual numbness as per her neurologist's recommendation. Per chart review, pt has previously altered Coumadin dosing based on neurologist or cardiologist suggestions. Discussed with patient that [facility] will be sole anticoag provider, pt agrees. Reviewed with pt that [facility] is not current accepting PST patients, pt voices understanding. Pt requests twice weekly INR checks for now. Plan  Coumadin 7.5mg tonight, tomorrow, 5mg on Sunday, 7.5mg on Mon. Recheck INR 4 days. Patient reminded to call the Anticoagulation Clinic with any signs or symptoms of bleeding or with any medication changes. Patient given instructions utilizing the teach back method.  Printed patient teaching/instruction included with AVS.       For Pharmacy Admin Tracking Only     Intervention Detail: Dose Adjustment: 1, reason: Therapy Optimization   Total # of Interventions Recommended: 1   Total # of Interventions Accepted: 1   Time Spent (min): 45

## 2022-04-11 DIAGNOSIS — F41.1 GAD (GENERALIZED ANXIETY DISORDER): ICD-10-CM

## 2022-04-11 RX ORDER — ALPRAZOLAM 0.25 MG/1
0.25 TABLET ORAL 3 TIMES DAILY PRN
Qty: 90 TABLET | Refills: 0 | Status: CANCELLED | OUTPATIENT
Start: 2022-04-11 | End: 2022-05-11

## 2022-04-12 ENCOUNTER — HOSPITAL ENCOUNTER (OUTPATIENT)
Dept: PHARMACY | Age: 52
Setting detail: THERAPIES SERIES
Discharge: HOME | End: 2022-04-12
Payer: MEDICARE

## 2022-04-12 ENCOUNTER — APPOINTMENT (OUTPATIENT)
Dept: PHARMACY | Age: 52
End: 2022-04-12
Payer: MEDICARE

## 2022-04-12 DIAGNOSIS — Z79.01 ANTICOAGULATED ON WARFARIN: ICD-10-CM

## 2022-04-12 DIAGNOSIS — Z51.81 ENCOUNTER FOR THERAPEUTIC DRUG MONITORING: ICD-10-CM

## 2022-04-12 DIAGNOSIS — D68.61 ANTI-PHOSPHOLIPID ANTIBODY SYNDROME (HCC): Primary | ICD-10-CM

## 2022-04-12 LAB — POC INR: 1.9 (ref 0.8–1.2)

## 2022-04-12 PROCEDURE — 99211 OFF/OP EST MAY X REQ PHY/QHP: CPT | Performed by: PHARMACIST

## 2022-04-12 PROCEDURE — 36416 COLLJ CAPILLARY BLOOD SPEC: CPT | Performed by: PHARMACIST

## 2022-04-12 PROCEDURE — 85610 PROTHROMBIN TIME: CPT | Performed by: PHARMACIST

## 2022-04-12 RX ORDER — ASPIRIN 325 MG
325 TABLET ORAL DAILY
COMMUNITY

## 2022-04-12 NOTE — PROGRESS NOTES
Medication Management [address]  [phone] (phone)  [phone] (fax)    Ms. Carolyne Quarles is a 46 y.o.  female with history of Antiphospholipid Syndrome who presents today for anticoagulation monitoring and adjustment. Patient verifies current dosing regimen and tablet strength. No missed or extra doses. Patient denies s/s bleeding/bruising/swelling/SOB/chest pain  No blood in urine or stool. No dietary changes. Eats healthy, eating about the same as always. No changes in OTC agents/Herbals. On Keflex for UTI. No change in alcohol use or tobacco use. No change in activity level. Patient denies headaches/dizziness/lightheadedness/falls. No vomiting/diarrhea or acute illness. No Procedures scheduled in the future at this time. Assessment:   Lab Results   Component Value Date    INR 1.90 (H) 04/12/2022    INR 1.90 (H) 04/08/2022    INR 1.90 (H) 04/08/2022     INR therapeutic   Recent Labs     04/12/22  1019   INR 1.90*         Plan:  Coumadin 7.5mg today, then continue Coumadin 7.5mg MWF and 5mg TThSaS. Recheck INR in 1 week(s). Patient reminded to call the Anticoagulation Clinic with any signs or symptoms of bleeding or with any medication changes. Patient given instructions utilizing the teach back method. After visit summary printed and reviewed with patient. Discharged ambulatory in no apparent distress.       For Pharmacy Admin Tracking Only     Intervention Detail: Dose Adjustment: 1, reason: Therapy Optimization   Total # of Interventions Recommended: 1   Total # of Interventions Accepted: 1   Time Spent (min): 20

## 2022-04-14 ENCOUNTER — APPOINTMENT (OUTPATIENT)
Dept: PHARMACY | Age: 52
End: 2022-04-14
Payer: MEDICARE

## 2022-04-16 ENCOUNTER — NURSE ONLY (OUTPATIENT)
Dept: LAB | Age: 52
End: 2022-04-16

## 2022-04-16 DIAGNOSIS — E83.42 HYPOMAGNESEMIA: ICD-10-CM

## 2022-04-16 LAB — MAGNESIUM: 1.8 MG/DL (ref 1.6–2.4)

## 2022-04-18 ENCOUNTER — PATIENT MESSAGE (OUTPATIENT)
Dept: FAMILY MEDICINE CLINIC | Age: 52
End: 2022-04-18

## 2022-04-18 DIAGNOSIS — F41.1 GAD (GENERALIZED ANXIETY DISORDER): ICD-10-CM

## 2022-04-18 RX ORDER — ALPRAZOLAM 0.25 MG/1
0.25 TABLET ORAL 3 TIMES DAILY PRN
Qty: 90 TABLET | Refills: 0 | Status: SHIPPED | OUTPATIENT
Start: 2022-04-18 | End: 2022-05-16 | Stop reason: SDUPTHER

## 2022-04-18 NOTE — TELEPHONE ENCOUNTER
From: Jack Garrison  To: Oren Otero  Sent: 4/18/2022 8:45 AM EDT  Subject: Xanax    Good morning I'm sorry to bother you I have been taking my Xanax as directed and doing well I was wondering if you could call that and it is due to be filled today I appreciate your time the Coumadin Clinic is going well and I appreciate the referral thank you so much

## 2022-04-19 ENCOUNTER — HOSPITAL ENCOUNTER (OUTPATIENT)
Dept: PHARMACY | Age: 52
Setting detail: THERAPIES SERIES
Discharge: HOME | End: 2022-04-19
Payer: MEDICARE

## 2022-04-19 DIAGNOSIS — Z51.81 ENCOUNTER FOR THERAPEUTIC DRUG MONITORING: ICD-10-CM

## 2022-04-19 DIAGNOSIS — Z79.01 ANTICOAGULATED ON WARFARIN: ICD-10-CM

## 2022-04-19 DIAGNOSIS — D68.61 ANTI-PHOSPHOLIPID ANTIBODY SYNDROME (HCC): Primary | ICD-10-CM

## 2022-04-19 LAB — POC INR: 2.3 (ref 0.8–1.2)

## 2022-04-19 PROCEDURE — 99211 OFF/OP EST MAY X REQ PHY/QHP: CPT | Performed by: PHARMACIST

## 2022-04-19 PROCEDURE — 85610 PROTHROMBIN TIME: CPT | Performed by: PHARMACIST

## 2022-04-19 PROCEDURE — 36416 COLLJ CAPILLARY BLOOD SPEC: CPT | Performed by: PHARMACIST

## 2022-04-19 NOTE — PROGRESS NOTES
Medication Management [address]  [phone] (phone)  [phone] (fax)    Ms. Sheng Inman is a 46 y.o.  female with history of Antiphospholipid Syndrome who presents today for anticoagulation monitoring and adjustment. Patient verifies current dosing regimen and tablet strength. Took 7.5mg on 4/17 instead of 5mg as instructed. States was away from home for the day and only had the 7.5mg tablet with her. Patient denies s/s bleeding/bruising/swelling/SOB/chest pain  No blood in urine or stool. No dietary changes. No changes in OTC agents/Herbals. Still on Keflex for UTI. Has discussed with neurologist and will be taking Aspirin 325mg daily. Educated pt to always take with food and monitor for any signs or symptoms of GI bleed. No change in alcohol use or tobacco use. No change in activity level. Patient denies headaches/dizziness/lightheadedness/falls. No vomiting/diarrhea or acute illness. No Procedures scheduled in the future at this time. Assessment:   Lab Results   Component Value Date    INR 2.30 (H) 04/19/2022    INR 1.90 (H) 04/12/2022    INR 1.90 (H) 04/08/2022     INR therapeutic   Recent Labs     04/19/22  1048   INR 2.30*     INR therapeutic after 47.5mg warfarin in the past 7 days. Plan:  Increase Coumadin 5mg MF and 7.5mg TWThSaS (weekly dose of 47.5mg over 7 days). Recheck INR in 1 week(s). Patient reminded to call the Anticoagulation Clinic with signs or symptoms of bleeding or with any medication changes. Patient given instructions utilizing the teach back method. After visit summary printed and reviewed with patient. Discharged ambulatory in no apparent distress.       For Pharmacy Admin Tracking Only     Intervention Detail: Dose Adjustment: 1, reason: Therapy Optimization   Total # of Interventions Recommended: 1   Total # of Interventions Accepted: 1   Time Spent (min): 20

## 2022-04-21 ENCOUNTER — TELEPHONE (OUTPATIENT)
Dept: FAMILY MEDICINE CLINIC | Age: 52
End: 2022-04-21

## 2022-04-26 ENCOUNTER — HOSPITAL ENCOUNTER (OUTPATIENT)
Dept: PHARMACY | Age: 52
Setting detail: THERAPIES SERIES
Discharge: HOME | End: 2022-04-26
Payer: MEDICARE

## 2022-04-26 DIAGNOSIS — Z51.81 ENCOUNTER FOR THERAPEUTIC DRUG MONITORING: ICD-10-CM

## 2022-04-26 DIAGNOSIS — D68.61 ANTI-PHOSPHOLIPID ANTIBODY SYNDROME (HCC): Primary | ICD-10-CM

## 2022-04-26 DIAGNOSIS — Z79.01 ANTICOAGULATED ON WARFARIN: ICD-10-CM

## 2022-04-26 LAB — POC INR: 1.5 (ref 0.8–1.2)

## 2022-04-26 PROCEDURE — 85610 PROTHROMBIN TIME: CPT | Performed by: PHARMACIST

## 2022-04-26 PROCEDURE — 99212 OFFICE O/P EST SF 10 MIN: CPT | Performed by: PHARMACIST

## 2022-04-26 PROCEDURE — 36416 COLLJ CAPILLARY BLOOD SPEC: CPT | Performed by: PHARMACIST

## 2022-04-26 NOTE — PROGRESS NOTES
Medication Management [address]   [phone] (phone)  [phone] (fax)    Ms. Lizy Urrutia is a 46 y.o.  female with history of Antiphospholipid Syndrome who presents today for anticoagulation monitoring and adjustment. Patient verifies tablet strength. Cannot verify regimen, states she follows the calender. No extra doses. Missed 5mg dose on 4/25, states she fell asleep and did not take it, but then states she typically takes her dose at noon every day. Patient denies s/s bleeding/bruising/swelling/SOB/chest pain  No blood in urine or stool. No dietary changes. No changes in medication/OTC agents/Herbals. No change in alcohol use or tobacco use. No change in activity level. Patient denies dizziness/lightheadedness/falls. Has a headache. No vomiting/diarrhea or acute illness. No Procedures scheduled in the future at this time. Assessment:   Lab Results   Component Value Date    INR 1.50 (H) 04/26/2022    INR 2.30 (H) 04/19/2022    INR 1.90 (H) 04/12/2022     INR subtherapeutic   Recent Labs     04/26/22  1547   INR 1.50*         Patient interview completed and discussed with pharmacist by Raymond Esparza PharmD Candidate 2020. Pt is newer to [facility]. Pt has been on Coumadin for >20 years, but regimen is not clearly established. Pt had previously been dosed by PCP. Plan:  Coumadin 12.5mg today, then continue Coumadin 5mg MF and 7.5mg TWThSaS. Recheck INR in 1 week(s). Patient reminded to call the Anticoagulation Clinic with any signs or symptoms of bleeding or with any medication changes. Patient given instructions utilizing the teach back method. After visit summary printed and reviewed with patient. Discharged ambulatory in no apparent distress.       For Pharmacy Admin Tracking Only     Intervention Detail: Dose Adjustment: 1, reason: Therapy Optimization   Total # of Interventions Recommended: 1   Total # of Interventions Accepted: 1   Time Spent (min): 20

## 2022-05-02 ENCOUNTER — APPOINTMENT (OUTPATIENT)
Dept: PHARMACY | Age: 52
End: 2022-05-02
Payer: MEDICARE

## 2022-05-03 ENCOUNTER — HOSPITAL ENCOUNTER (OUTPATIENT)
Dept: PHARMACY | Age: 52
Setting detail: THERAPIES SERIES
Discharge: HOME | End: 2022-05-03
Payer: MEDICARE

## 2022-05-03 ENCOUNTER — PATIENT MESSAGE (OUTPATIENT)
Dept: FAMILY MEDICINE CLINIC | Age: 52
End: 2022-05-03

## 2022-05-03 DIAGNOSIS — Z51.81 ENCOUNTER FOR THERAPEUTIC DRUG MONITORING: ICD-10-CM

## 2022-05-03 DIAGNOSIS — Z79.01 ANTICOAGULATED ON WARFARIN: ICD-10-CM

## 2022-05-03 DIAGNOSIS — D68.61 ANTI-PHOSPHOLIPID ANTIBODY SYNDROME (HCC): Primary | ICD-10-CM

## 2022-05-03 LAB — POC INR: 1.7 (ref 0.8–1.2)

## 2022-05-03 PROCEDURE — 36416 COLLJ CAPILLARY BLOOD SPEC: CPT

## 2022-05-03 PROCEDURE — 99212 OFFICE O/P EST SF 10 MIN: CPT

## 2022-05-03 PROCEDURE — 85610 PROTHROMBIN TIME: CPT

## 2022-05-03 NOTE — TELEPHONE ENCOUNTER
From: Jc Lopes  To: Chary Crow  Sent: 5/3/2022 3:58 PM EDT  Subject: MRI    Good afternoon Melissa Nilson sorry to bother you I got my INR tested we are struggling to get it where it needs to be but I feel very confident with the Coumadin Clinic.  I have some dizziness yesterday and numbness I haven't had an MRI on my brain and almost 20 years and I was wondering if we could get one I did call [place] and they said it would be paid because of my history what's my stroke I just had some headaches and dizziness and numbness with my low INR I think I'm a little apprehensive and just want to make sure there's nothing new but that be something we could do I could have it done at [address] [place] [address] thank you for your consideration

## 2022-05-03 NOTE — PROGRESS NOTES
Medication Management [address]   [phone] (phone)  [phone] (fax)    Ms. Jack Garrison is a 46 y.o.  female with history of Antiphospholipid Syndrome who presents today for anticoagulation monitoring and adjustment. Patient verifies current dosing regimen and tablet strength. No missed or extra doses. Patient denies s/s bleeding/bruising/swelling/SOB/chest pain  No blood in urine or stool. No dietary changes. No changes in medication/OTC agents/Herbals. No change in alcohol use or tobacco use. No change in activity level. Patient denies headaches/dizziness/lightheadedness/falls. - Headache and numbness recently  No vomiting/diarrhea or acute illness. No Procedures scheduled in the future at this time. Assessment:   Lab Results   Component Value Date    INR 1.70 (H) 2022    INR 1.50 (H) 2022    INR 2.30 (H) 2022     INR subtherapeutic   Recent Labs     22  1502   INR 1.70*     Plan:  Coumadin 12.5 mg today then increase Coumadin 7.5 mg daily (~10.5% increase). Recheck INR in 1 week(s). Patient reminded to call the Anticoagulation Clinic with signs or symptoms of bleeding or with any medication changes. Patient given instructions utilizing the teach back method. After visit summary printed and reviewed with patient. Discharged ambulatory in no apparent distress.       For Pharmacy Admin Tracking Only     Intervention Detail: Adherence Monitorin and Dose Adjustment: 1, reason: Therapy Optimization   Total # of Interventions Recommended: 2   Total # of Interventions Accepted: 2   Time Spent (min): [address], PharmD, BCPS  5/3/2022  3:22 PM

## 2022-05-04 ENCOUNTER — OFFICE VISIT (OUTPATIENT)
Dept: FAMILY MEDICINE CLINIC | Age: 52
End: 2022-05-04
Payer: MEDICARE

## 2022-05-04 VITALS
WEIGHT: 127.2 LBS | HEART RATE: 52 BPM | RESPIRATION RATE: 16 BRPM | BODY MASS INDEX: 19.97 KG/M2 | OXYGEN SATURATION: 99 % | TEMPERATURE: 97 F | DIASTOLIC BLOOD PRESSURE: 62 MMHG | HEIGHT: 67 IN | SYSTOLIC BLOOD PRESSURE: 110 MMHG

## 2022-05-04 DIAGNOSIS — D68.61 ANTI-PHOSPHOLIPID ANTIBODY SYNDROME (HCC): ICD-10-CM

## 2022-05-04 DIAGNOSIS — I63.9 CEREBROVASCULAR ACCIDENT (CVA), UNSPECIFIED MECHANISM (HCC): Primary | ICD-10-CM

## 2022-05-04 PROCEDURE — G8427 DOCREV CUR MEDS BY ELIG CLIN: HCPCS | Performed by: NURSE PRACTITIONER

## 2022-05-04 PROCEDURE — 3017F COLORECTAL CA SCREEN DOC REV: CPT | Performed by: NURSE PRACTITIONER

## 2022-05-04 PROCEDURE — 99213 OFFICE O/P EST LOW 20 MIN: CPT | Performed by: NURSE PRACTITIONER

## 2022-05-04 PROCEDURE — G8420 CALC BMI NORM PARAMETERS: HCPCS | Performed by: NURSE PRACTITIONER

## 2022-05-04 PROCEDURE — 1036F TOBACCO NON-USER: CPT | Performed by: NURSE PRACTITIONER

## 2022-05-04 RX ORDER — ROSUVASTATIN CALCIUM 10 MG/1
TABLET, COATED ORAL
COMMUNITY
Start: 2022-02-14

## 2022-05-04 RX ORDER — CLONIDINE HYDROCHLORIDE 0.1 MG/1
TABLET ORAL
COMMUNITY
Start: 2022-02-14

## 2022-05-04 RX ORDER — DIPHENHYDRAMINE HCL 50 MG/1
CAPSULE ORAL
COMMUNITY
Start: 2022-02-11

## 2022-05-04 RX ORDER — VERAPAMIL HYDROCHLORIDE 240 MG/1
TABLET, FILM COATED, EXTENDED RELEASE ORAL
COMMUNITY
Start: 2022-03-26

## 2022-05-04 NOTE — LETTER
[facility]  [address]  Phone: [phone]  Fax: [address], PB - CNP        May 4, 2022     Patient: John Lyles   YOB: 1970   Date of Visit: 5/4/2022       To Whom It May Concern: It is my medical opinion that Modesto Sampson should be excused from work 5/4/22. She was sent to the ER for further evalaution. If you have any questions or concerns, please don't hesitate to call.     Sincerely,        PB Leach - CNP

## 2022-05-04 NOTE — PROGRESS NOTES
Lenora Mohan is a 46 y.o. female thatpresents for Numbness (left side of face )      History obtained today from Patient. Left face and arm numbness  Started on Monday  Takes her Aspirin and Coumadin and it resolves  Notes intermittent weakness  Says \"left side feels dense and heavy\"    I have reviewed the patient's past medical history, past surgical history, allergies,medications, social and family history and I have made updates where appropriate. Past Medical History:   Diagnosis Date    Acute CVA (cerebrovascular accident) [facility]) 1998    Dr. Charmaine Johnson antibody syndrome [facility])     takes Coumadin    Arrhythmia     Disease of blood and blood forming organ     SVT (supraventricular tachycardia) ([facility])        Social History     Tobacco Use    Smoking status: Never Smoker    Smokeless tobacco: Never Used   Substance Use Topics    Alcohol use: No    Drug use: No       History reviewed. No pertinent family history. Review of Systems        PHYSICAL EXAM:  /62   Pulse 52   Temp 97 °F (36.1 °C) (Infrared)   Resp 16   Ht 5' 7\" (1.702 m)   Wt 127 lb 3.2 oz (57.7 kg)   SpO2 99%   BMI 19.92 kg/m²     Physical Exam  Constitutional:       Appearance: Normal appearance. HENT:      Head: Normocephalic and atraumatic. Right Ear: External ear normal.      Left Ear: External ear normal.      Nose: Nose normal.      Mouth/Throat:      Mouth: Mucous membranes are moist.   Eyes:      Conjunctiva/sclera: Conjunctivae normal.   Cardiovascular:      Rate and Rhythm: Normal rate and regular rhythm. Pulses: Normal pulses. Heart sounds: Normal heart sounds. Pulmonary:      Effort: Pulmonary effort is normal.      Breath sounds: Normal breath sounds. Abdominal:      General: Bowel sounds are normal.      Palpations: Abdomen is soft. Musculoskeletal:         General: Normal range of motion. Cervical back: Normal range of motion.    Skin:     General: Skin is warm and dry. Neurological:      General: No focal deficit present. Mental Status: She is alert and oriented to person, place, and time. Psychiatric:         Mood and Affect: Mood normal.         Behavior: Behavior normal.           ASSESSMENT & PLAN  Pedro Mcallister was seen today for numbness. Diagnoses and all orders for this visit:    Cerebrovascular accident (CVA), unspecified mechanism ([facility] [address]    Anti-phospholipid antibody syndrome ([facility]    left sided N/T and weakness  recommend ER    No follow-ups on file. Referred to ER    All copied or forwarded information in the progress note was verified by me to be accurate at the time of visit  Patient's past medical, surgical, social and family history were reviewed and updated     All patient questions answered. Patient voiced understanding.

## 2022-05-10 ENCOUNTER — APPOINTMENT (OUTPATIENT)
Dept: PHARMACY | Age: 52
End: 2022-05-10
Payer: MEDICARE

## 2022-05-10 ENCOUNTER — HOSPITAL ENCOUNTER (OUTPATIENT)
Dept: PHARMACY | Age: 52
Setting detail: THERAPIES SERIES
Discharge: HOME | End: 2022-05-10
Payer: MEDICARE

## 2022-05-10 DIAGNOSIS — Z79.01 ANTICOAGULATED ON WARFARIN: ICD-10-CM

## 2022-05-10 DIAGNOSIS — D68.61 ANTI-PHOSPHOLIPID ANTIBODY SYNDROME (HCC): Primary | ICD-10-CM

## 2022-05-10 DIAGNOSIS — Z51.81 ENCOUNTER FOR THERAPEUTIC DRUG MONITORING: ICD-10-CM

## 2022-05-10 LAB — POC INR: 2.5 (ref 0.8–1.2)

## 2022-05-10 PROCEDURE — 85610 PROTHROMBIN TIME: CPT

## 2022-05-10 PROCEDURE — 36416 COLLJ CAPILLARY BLOOD SPEC: CPT

## 2022-05-10 PROCEDURE — 99211 OFF/OP EST MAY X REQ PHY/QHP: CPT

## 2022-05-10 NOTE — PROGRESS NOTES
Medication Management [address]  [phone] (phone)  [phone] (fax)    Ms. Alex Walton is a 46 y.o.  female with history of CVA, Antiphospholipid Syndrome who presents today for anticoagulation monitoring and adjustment. Patient verifies current dosing regimen and tablet strength. No missed or extra doses. Patient denies s/s bleeding/bruising/swelling/SOB/chest pain  No blood in urine or stool. No dietary changes. - Eating less broccoli. More cautious now especially since so difficult to anticoagulant. No changes in medication/OTC agents/Herbals. No change in alcohol use or tobacco use. No change in activity level. Patient denies headaches/dizziness/lightheadedness/falls. - Had headache and dizziness last Wednesday. No vomiting/diarrhea or acute illness. No Procedures scheduled in the future at this time.  - Had some numbness last night in the cheek and hand. Cainkarli Lesliech to ER last Wednesday and had MRI. MRI did not show any clots. Assessment:   Lab Results   Component Value Date    INR 2.50 (H) 05/10/2022    INR 2.08 (H) 05/04/2022    INR 1.70 (H) 05/03/2022     INR therapeutic   Recent Labs     05/10/22  1027   INR 2.50*     Plan:  Continue Coumadin 7.5 mg once daily. Recheck INR in 1 week(s). Patient reminded to call the Anticoagulation Clinic with any signs or symptoms of bleeding or with any medication changes. Patient given instructions utilizing the teach back method. After visit summary printed and reviewed with patient. Discharged ambulatory in no apparent distress.     For Pharmacy Admin Tracking Only   Time Spent (min): [address], PharmD, BCPS  5/10/2022  10:40 AM

## 2022-05-11 DIAGNOSIS — D68.61 ANTI-PHOSPHOLIPID ANTIBODY SYNDROME (HCC): ICD-10-CM

## 2022-05-12 RX ORDER — WARFARIN SODIUM 7.5 MG/1
7.5 TABLET ORAL DAILY
Qty: 30 TABLET | Refills: 3 | Status: SHIPPED | OUTPATIENT
Start: 2022-05-12 | End: 2022-09-12

## 2022-05-15 DIAGNOSIS — D68.61 ANTI-PHOSPHOLIPID ANTIBODY SYNDROME (HCC): ICD-10-CM

## 2022-05-16 DIAGNOSIS — F41.1 GAD (GENERALIZED ANXIETY DISORDER): ICD-10-CM

## 2022-05-16 RX ORDER — WARFARIN SODIUM 5 MG/1
TABLET ORAL
Qty: 30 TABLET | Refills: 3 | Status: SHIPPED | OUTPATIENT
Start: 2022-05-16

## 2022-05-16 RX ORDER — ALPRAZOLAM 0.25 MG/1
0.25 TABLET ORAL 3 TIMES DAILY PRN
Qty: 90 TABLET | Refills: 0 | Status: SHIPPED | OUTPATIENT
Start: 2022-05-16 | End: 2022-06-10 | Stop reason: SDUPTHER

## 2022-05-17 ENCOUNTER — HOSPITAL ENCOUNTER (OUTPATIENT)
Dept: PHARMACY | Age: 52
Setting detail: THERAPIES SERIES
Discharge: HOME | End: 2022-05-17
Payer: MEDICARE

## 2022-05-17 DIAGNOSIS — Z51.81 ENCOUNTER FOR THERAPEUTIC DRUG MONITORING: ICD-10-CM

## 2022-05-17 DIAGNOSIS — D68.61 ANTI-PHOSPHOLIPID ANTIBODY SYNDROME (HCC): Primary | ICD-10-CM

## 2022-05-17 DIAGNOSIS — Z79.01 ANTICOAGULATED ON WARFARIN: ICD-10-CM

## 2022-05-17 LAB — POC INR: 3 (ref 0.8–1.2)

## 2022-05-17 PROCEDURE — 85610 PROTHROMBIN TIME: CPT

## 2022-05-17 PROCEDURE — G0248 DEMONSTRATE USE HOME INR MON: HCPCS

## 2022-05-17 PROCEDURE — 99211 OFF/OP EST MAY X REQ PHY/QHP: CPT

## 2022-05-17 PROCEDURE — 36416 COLLJ CAPILLARY BLOOD SPEC: CPT

## 2022-05-17 NOTE — PROGRESS NOTES
Medication Management [address]   [phone] (phone)  [phone] (fax)    Ms. Jasbir Long is a 46 y.o.  female with history of CVA, Antiphospholipid Syndrome who presents today for anticoagulation monitoring and adjustment. Patient verifies current dosing regimen and tablet strength. No missed or extra doses. Patient denies s/s bleeding/bruising/swelling/SOB/chest pain  - Small bruise on right arm  No blood in urine or stool. No dietary changes. - Maintaining same diet daily  No changes in medication/OTC agents/Herbals. No change in alcohol use or tobacco use. - 1 small drink over the weekend  No change in activity level. Patient denies headaches/dizziness/lightheadedness/falls. No vomiting/diarrhea or acute illness. No Procedures scheduled in the future at this time. Assessment:   Lab Results   Component Value Date    INR 3.00 (H) 05/17/2022    INR 2.50 (H) 05/10/2022    INR 2.08 (H) 05/04/2022     INR therapeutic   Recent Labs     05/17/22  1344   INR 3.00*     Plan:  Continue Coumadin 7.5 mg once daily. Recheck INR in 10 day(s). Patient reminded to call the Anticoagulation Clinic with any signs or symptoms of bleeding or with any medication changes. Patient given instructions utilizing the teach back method. After visit summary printed and reviewed with patient. Discharged ambulatory in no apparent distress.     For Pharmacy Admin Tracking Only   Time Spent (min): [address], PharmJOANIE, BCPS  5/17/2022  1:59 PM

## 2022-05-23 ENCOUNTER — HOSPITAL ENCOUNTER (OUTPATIENT)
Dept: PHARMACY | Age: 52
Setting detail: THERAPIES SERIES
Discharge: HOME | End: 2022-05-23
Payer: MEDICARE

## 2022-05-23 DIAGNOSIS — Z51.81 ENCOUNTER FOR THERAPEUTIC DRUG MONITORING: ICD-10-CM

## 2022-05-23 DIAGNOSIS — D68.61 ANTI-PHOSPHOLIPID ANTIBODY SYNDROME (HCC): Primary | ICD-10-CM

## 2022-05-23 DIAGNOSIS — Z79.01 ANTICOAGULATED ON WARFARIN: ICD-10-CM

## 2022-05-23 LAB — POC INR: 2.6 (ref 0.8–1.2)

## 2022-05-23 PROCEDURE — 99211 OFF/OP EST MAY X REQ PHY/QHP: CPT

## 2022-05-23 PROCEDURE — 85610 PROTHROMBIN TIME: CPT

## 2022-05-23 PROCEDURE — 36416 COLLJ CAPILLARY BLOOD SPEC: CPT

## 2022-05-23 NOTE — PROGRESS NOTES
Medication Management [address]  [phone] (phone)  [phone] (fax)    Ms. Niesha Meredith is a 46 y.o.  female with history of Antiphospholipid Syndrome who presents today for anticoagulation monitoring and adjustment. Patient verifies current dosing regimen and tablet strength. No missed or extra doses. Patient denies s/s bleeding/bruising/swelling/SOB/chest pain  No blood in urine or stool. No dietary changes. No changes in medication/OTC agents/Herbals. No change in alcohol use or tobacco use. No change in activity level. Patient denies headaches/dizziness/lightheadedness/falls. No vomiting/diarrhea or acute illness. No Procedures scheduled in the future at this time. Assessment:   Lab Results   Component Value Date    INR 2.60 (H) 05/23/2022    INR 3.00 (H) 05/17/2022    INR 2.50 (H) 05/10/2022     INR therapeutic   Recent Labs     05/23/22  1413   INR 2.60*     Patient presents with third consecutive therapeutic INR while on current regimen. Patient wants another INR check Friday 5/27/22. Plan:  Continue Coumadin 7.5 mg daily. Recheck INR in 4 day(s) per patient request.  Patient reminded to call the Anticoagulation Clinic with any signs or symptoms of bleeding or with any medication changes. Patient given instructions utilizing the teach back method. After visit summary printed and reviewed with patient. Discharged ambulatory in no apparent distress.     For Pharmacy Admin Tracking Only     Total # of Interventions Recommended: 0   Total # of Interventions Accepted: 0   Time Spent (min): 4278  [facility], Demetria, BCPS  5/23/2022  2:20 PM

## 2022-05-27 ENCOUNTER — APPOINTMENT (OUTPATIENT)
Dept: PHARMACY | Age: 52
End: 2022-05-27
Payer: MEDICARE

## 2022-05-31 ENCOUNTER — HOSPITAL ENCOUNTER (OUTPATIENT)
Dept: PHARMACY | Age: 52
Setting detail: THERAPIES SERIES
Discharge: HOME | End: 2022-05-31
Payer: MEDICARE

## 2022-05-31 DIAGNOSIS — Z51.81 ENCOUNTER FOR THERAPEUTIC DRUG MONITORING: ICD-10-CM

## 2022-05-31 DIAGNOSIS — D68.61 ANTI-PHOSPHOLIPID ANTIBODY SYNDROME (HCC): Primary | ICD-10-CM

## 2022-05-31 DIAGNOSIS — Z79.01 ANTICOAGULATED ON WARFARIN: ICD-10-CM

## 2022-05-31 LAB — POC INR: 2.5 (ref 0.8–1.2)

## 2022-05-31 PROCEDURE — 99211 OFF/OP EST MAY X REQ PHY/QHP: CPT

## 2022-05-31 PROCEDURE — 85610 PROTHROMBIN TIME: CPT

## 2022-05-31 PROCEDURE — 36416 COLLJ CAPILLARY BLOOD SPEC: CPT

## 2022-05-31 RX ORDER — CEPHALEXIN 500 MG/1
500 CAPSULE ORAL 2 TIMES DAILY
COMMUNITY
Start: 2022-05-28 | End: 2022-06-06

## 2022-05-31 NOTE — PROGRESS NOTES
Medication Management [address]  [phone] (phone)  [phone] (fax)    Ms. Brenda Davalos is a 46 y.o.  female with history of CVA, Antiphospholipid Syndrome who presents today for anticoagulation monitoring and adjustment. Patient verifies current dosing regimen and tablet strength. No missed or extra doses. Patient denies s/s bleeding/bruising/swelling/SOB/chest pain  - Bruising on legs  No blood in urine or stool. No dietary changes. - Appetite has been down. No changes in medication/OTC agents/Herbals. - Started on Keflex 500 mg twice daily on Saturday 5/28 for UTI./a  No change in alcohol use or tobacco use. No change in activity level. - Working out in the mornings. Patient denies headaches/dizziness/lightheadedness/falls. No vomiting/diarrhea or acute illness. No Procedures scheduled in the future at this time. Assessment:   Lab Results   Component Value Date    INR 2.50 (H) 05/31/2022    INR 2.60 (H) 05/23/2022    INR 3.00 (H) 05/17/2022     INR therapeutic   Recent Labs     05/31/22  1447   INR 2.50*         Plan:  Continue Coumadin 7.5 mg daily. Recheck INR in 6 day(s) per patient request.  Patient reminded to call the Anticoagulation Clinic with any signs or symptoms of bleeding or with any medication changes. Patient given instructions utilizing the teach back method. After visit summary printed and reviewed with patient. Discharged ambulatory in no apparent distress.     For Pharmacy Admin Tracking Only   Time Spent (min): [address], PharmD, BCPS  5/31/2022  3:25 PM

## 2022-06-06 ENCOUNTER — HOSPITAL ENCOUNTER (OUTPATIENT)
Dept: PHARMACY | Age: 52
Setting detail: THERAPIES SERIES
Discharge: HOME | End: 2022-06-06
Payer: MEDICARE

## 2022-06-06 ENCOUNTER — PATIENT MESSAGE (OUTPATIENT)
Dept: FAMILY MEDICINE CLINIC | Age: 52
End: 2022-06-06

## 2022-06-06 ENCOUNTER — APPOINTMENT (OUTPATIENT)
Dept: PHARMACY | Age: 52
End: 2022-06-06
Payer: MEDICARE

## 2022-06-06 DIAGNOSIS — B37.9 YEAST INFECTION: ICD-10-CM

## 2022-06-06 DIAGNOSIS — R25.2 LEG CRAMPS: Primary | ICD-10-CM

## 2022-06-06 DIAGNOSIS — D68.61 ANTI-PHOSPHOLIPID ANTIBODY SYNDROME (HCC): Primary | ICD-10-CM

## 2022-06-06 DIAGNOSIS — Z79.01 ANTICOAGULATED ON WARFARIN: ICD-10-CM

## 2022-06-06 DIAGNOSIS — Z51.81 ENCOUNTER FOR THERAPEUTIC DRUG MONITORING: ICD-10-CM

## 2022-06-06 DIAGNOSIS — L30.9 ECZEMA, UNSPECIFIED TYPE: ICD-10-CM

## 2022-06-06 LAB — POC INR: 3.4 (ref 0.8–1.2)

## 2022-06-06 PROCEDURE — 85610 PROTHROMBIN TIME: CPT

## 2022-06-06 PROCEDURE — 99212 OFFICE O/P EST SF 10 MIN: CPT

## 2022-06-06 PROCEDURE — 36416 COLLJ CAPILLARY BLOOD SPEC: CPT

## 2022-06-06 RX ORDER — CLOBETASOL PROPIONATE 0.5 MG/G
OINTMENT TOPICAL 2 TIMES DAILY
Qty: 60 G | Refills: 0 | Status: SHIPPED | OUTPATIENT
Start: 2022-06-06

## 2022-06-06 RX ORDER — FLUCONAZOLE 150 MG/1
TABLET ORAL
Qty: 2 TABLET | Refills: 0 | Status: SHIPPED | OUTPATIENT
Start: 2022-06-06 | End: 2022-06-06

## 2022-06-06 NOTE — PROGRESS NOTES
Medication Management [address]  [phone] (phone)  [phone] (fax)    Ms. Malathi Tao is a 46 y.o.  female with history of Antiphospholipid Syndrome who presents today for anticoagulation monitoring and adjustment. Patient verifies current dosing regimen and tablet strength. No missed or extra doses. Patient denies s/s bleeding/swelling/SOB/chest pain. Patient has noticed more bruising than normal.   No blood in urine or stool. Patient has been eating less salads than normal. She has only had one over the past week and usually eats one every day. No changes in OTC agents/Herbals. Patient is not going to use Fluconazole that was prescribed for her. No change in tobacco use. Patient had a beer this week. Usually she does not drink. No change in activity level. Patient denies dizziness/lightheadedness/falls. Patient had a migraine on Saturday. This has resolved. No vomiting/diarrhea or acute illness. No Procedures scheduled in the future at this time. Patient will likely get a tooth pulled in the upcoming months. She will notify clinic when this is. Assessment:   Lab Results   Component Value Date    INR 3.40 (H) 06/06/2022    INR 2.50 (H) 05/31/2022    INR 2.60 (H) 05/23/2022     INR supratherapeutic   Recent Labs     06/06/22  1409   INR 3.40*     Patient interview completed and discussed with pharmacist by Naveen Rider, PharmD Candidate. Patient is supratherapeutic today which is likely due to less Vitamin K intake and recent alcoholic drink. Previously, patient had four consecutive therapeutic INRs while on current regimen. Plan:  Coumadin 3.75 mg x 1 dose today 6/6/22 then continue Coumadin 7.5 mg daily. Recheck INR in 1 week(s). Patient reminded to call the Anticoagulation Clinic with any signs or symptoms of bleeding or with any medication changes. Patient given instructions utilizing the teach back method.     After visit summary printed and reviewed with patient. Discharged ambulatory in no apparent distress.     For Pharmacy Admin Tracking Only     Intervention Detail: Dose Adjustment: 1, reason: Therapy Optimization   Total # of Interventions Recommended: 1   Total # of Interventions Accepted: 1   Time Spent (min): [address], PharmD, BCPS  6/6/2022  2:40 PM

## 2022-06-10 ENCOUNTER — PATIENT MESSAGE (OUTPATIENT)
Dept: FAMILY MEDICINE CLINIC | Age: 52
End: 2022-06-10

## 2022-06-10 DIAGNOSIS — F41.1 GAD (GENERALIZED ANXIETY DISORDER): ICD-10-CM

## 2022-06-10 RX ORDER — ALPRAZOLAM 0.25 MG/1
0.25 TABLET ORAL 3 TIMES DAILY PRN
Qty: 90 TABLET | Refills: 0 | Status: SHIPPED | OUTPATIENT
Start: 2022-06-10 | End: 2022-07-14 | Stop reason: SDUPTHER

## 2022-06-10 NOTE — TELEPHONE ENCOUNTER
From: Lenora Mohan  To: Jayant Hernández  Sent: 6/10/2022 10:55 AM EDT  Subject: Xanax    Good morning I'm sorry to bother you I just wanted to let you know my Xanax will be due to be refilled Tuesday I didn't know if you could send that in our if I needed to come in and see you thank you again for helping me

## 2022-06-14 ENCOUNTER — HOSPITAL ENCOUNTER (OUTPATIENT)
Dept: PHARMACY | Age: 52
Setting detail: THERAPIES SERIES
Discharge: HOME | End: 2022-06-14
Payer: MEDICARE

## 2022-06-14 DIAGNOSIS — Z51.81 ENCOUNTER FOR THERAPEUTIC DRUG MONITORING: ICD-10-CM

## 2022-06-14 DIAGNOSIS — D68.61 ANTI-PHOSPHOLIPID ANTIBODY SYNDROME (HCC): Primary | ICD-10-CM

## 2022-06-14 DIAGNOSIS — Z79.01 ANTICOAGULATED ON WARFARIN: ICD-10-CM

## 2022-06-14 LAB — POC INR: 2.7 (ref 0.8–1.2)

## 2022-06-14 PROCEDURE — 36416 COLLJ CAPILLARY BLOOD SPEC: CPT | Performed by: PHARMACIST

## 2022-06-14 PROCEDURE — 99211 OFF/OP EST MAY X REQ PHY/QHP: CPT | Performed by: PHARMACIST

## 2022-06-14 PROCEDURE — 85610 PROTHROMBIN TIME: CPT | Performed by: PHARMACIST

## 2022-06-14 NOTE — PROGRESS NOTES
Medication Management [address]   [phone] (phone)  [phone] (fax)    Ms. Dennys Vance is a 46 y.o.  female with history of Antiphospholipid Syndrome who presents today for anticoagulation monitoring and adjustment. Patient verifies current dosing regimen and tablet strength. No missed or extra doses. Patient denies s/s bleeding/swelling/SOB/chest pain Bruise on calf from injury at work, resolving. No blood in urine or stool. No dietary changes. No changes in medication/OTC agents/Herbals. No change in alcohol use or tobacco use. No change in activity level. Patient denies headaches/dizziness/lightheadedness/falls. No vomiting/diarrhea or acute illness. No Procedures scheduled in the future at this time. Assessment:   Lab Results   Component Value Date    INR 2.70 (H) 06/14/2022    INR 3.40 (H) 06/06/2022    INR 2.50 (H) 05/31/2022     INR therapeutic   Recent Labs     06/14/22  1352   INR 2.70*         Plan:  Continue Coumadin 7.5mg daily. Recheck INR in 1 week(s) per patient request.  Patient reminded to call the Anticoagulation Clinic with any signs or symptoms of bleeding or with any medication changes. Patient given instructions utilizing the teach back method. After visit summary printed and reviewed with patient. Discharged ambulatory in no apparent distress.       For Pharmacy Admin Tracking Only     Time Spent (min): 20

## 2022-06-24 ENCOUNTER — PATIENT MESSAGE (OUTPATIENT)
Dept: FAMILY MEDICINE CLINIC | Age: 52
End: 2022-06-24

## 2022-06-24 DIAGNOSIS — R30.0 DYSURIA: ICD-10-CM

## 2022-06-24 DIAGNOSIS — B37.9 YEAST INFECTION: Primary | ICD-10-CM

## 2022-06-24 RX ORDER — FLUCONAZOLE 150 MG/1
150 TABLET ORAL
Qty: 2 TABLET | Refills: 0 | Status: SHIPPED | OUTPATIENT
Start: 2022-06-24 | End: 2022-06-28

## 2022-06-24 RX ORDER — CEPHALEXIN 500 MG/1
500 CAPSULE ORAL 2 TIMES DAILY
Qty: 20 CAPSULE | Refills: 0 | Status: SHIPPED | OUTPATIENT
Start: 2022-06-24 | End: 2022-10-19 | Stop reason: ALTCHOICE

## 2022-06-24 NOTE — TELEPHONE ENCOUNTER
From: Galdino Anderson  To: Paco Barba  Sent: 6/24/2022 9:30 AM EDT  Subject: Dona Argue my goodness I'm sorry I'm laughing I didn't mean to send it that quickly I do well on the cephalexin I usually get 500 bid I'm at work and I dipped it and I have some leukocytes and it's hurting and I have no time to go anywhere else I just wanted if I could get a 10-day Supply because I have to work and it's hurting it seems like the 250 bid is never enough I'm sorry to bother you thank you so much do you also have some Diflucan I can get just in case

## 2022-06-28 ENCOUNTER — APPOINTMENT (OUTPATIENT)
Dept: PHARMACY | Age: 52
End: 2022-06-28
Payer: MEDICARE

## 2022-07-06 RX ORDER — EPINEPHRINE 0.3 MG/.3ML
0.3 INJECTION SUBCUTANEOUS ONCE
Qty: 0.3 ML | Refills: 0 | Status: SHIPPED | OUTPATIENT
Start: 2022-07-06 | End: 2022-08-11 | Stop reason: SDUPTHER

## 2022-07-07 ENCOUNTER — HOSPITAL ENCOUNTER (OUTPATIENT)
Dept: PHARMACY | Age: 52
Setting detail: THERAPIES SERIES
Discharge: HOME | End: 2022-07-07
Payer: MEDICARE

## 2022-07-07 DIAGNOSIS — D68.61 ANTI-PHOSPHOLIPID ANTIBODY SYNDROME (HCC): Primary | ICD-10-CM

## 2022-07-07 DIAGNOSIS — Z79.01 ANTICOAGULATED ON WARFARIN: ICD-10-CM

## 2022-07-07 DIAGNOSIS — Z51.81 ENCOUNTER FOR THERAPEUTIC DRUG MONITORING: ICD-10-CM

## 2022-07-07 LAB — POC INR: 2.4 (ref 0.8–1.2)

## 2022-07-07 PROCEDURE — 85610 PROTHROMBIN TIME: CPT

## 2022-07-07 PROCEDURE — 36416 COLLJ CAPILLARY BLOOD SPEC: CPT

## 2022-07-07 PROCEDURE — 99211 OFF/OP EST MAY X REQ PHY/QHP: CPT

## 2022-07-07 NOTE — PROGRESS NOTES
Medication Management [address]  [phone] (phone)  [phone] (fax)    Ms. Charity Corrales is a 46 y.o.  female with history of Antiphospholipid Syndrome who presents today for anticoagulation monitoring and adjustment. Patient verifies current dosing regimen and tablet strength. No missed or extra doses. Patient denies s/s bleeding/bruising/swelling/SOB/chest pain  States she saw some blood in toilet 2 days earlier this week, none today. States occurred with straining bowel movement and she believes it is a hemorrhoid. She will continue to monitor. No blood in urine or stool. No dietary changes. No changes in OTC agents/Herbals. States she is on Keflex for UTI on day 6/10. Not taking Diflucan. No change in alcohol use or tobacco use. No change in activity level. Patient denies headaches/dizziness/lightheadedness/falls. No vomiting/diarrhea or acute illness. No Procedures scheduled in the future at this time. Assessment:   Lab Results   Component Value Date    INR 2.40 (H) 07/07/2022    INR 2.70 (H) 06/14/2022    INR 3.40 (H) 06/06/2022     INR therapeutic   Recent Labs     07/07/22  1114   INR 2.40*     Plan:  Continue Coumadin 7.5 mg daily. Recheck INR in 2 week(s). Patient reminded to call the Anticoagulation Clinic with any signs or symptoms of bleeding or with any medication changes. Patient given instructions utilizing the teach back method. After visit summary printed and reviewed with patient. Discharged ambulatory in no apparent distress.     For Pharmacy Admin Tracking Only     Time Spent (min): 15

## 2022-07-12 DIAGNOSIS — F41.1 GAD (GENERALIZED ANXIETY DISORDER): ICD-10-CM

## 2022-07-12 RX ORDER — ALPRAZOLAM 0.25 MG/1
0.25 TABLET ORAL 3 TIMES DAILY PRN
Qty: 90 TABLET | Refills: 0 | Status: CANCELLED | OUTPATIENT
Start: 2022-07-12 | End: 2022-08-11

## 2022-07-14 DIAGNOSIS — F41.1 GAD (GENERALIZED ANXIETY DISORDER): ICD-10-CM

## 2022-07-14 RX ORDER — ALPRAZOLAM 0.25 MG/1
0.25 TABLET ORAL 3 TIMES DAILY PRN
Qty: 90 TABLET | Refills: 0 | Status: SHIPPED | OUTPATIENT
Start: 2022-07-14 | End: 2022-08-11 | Stop reason: SDUPTHER

## 2022-07-21 ENCOUNTER — APPOINTMENT (OUTPATIENT)
Dept: PHARMACY | Age: 52
End: 2022-07-21
Payer: MEDICARE

## 2022-07-21 ENCOUNTER — HOSPITAL ENCOUNTER (OUTPATIENT)
Dept: PHARMACY | Age: 52
Setting detail: THERAPIES SERIES
Discharge: HOME | End: 2022-07-21
Payer: MEDICARE

## 2022-07-21 DIAGNOSIS — Z51.81 ENCOUNTER FOR THERAPEUTIC DRUG MONITORING: ICD-10-CM

## 2022-07-21 DIAGNOSIS — Z79.01 ANTICOAGULATED ON WARFARIN: ICD-10-CM

## 2022-07-21 DIAGNOSIS — D68.61 ANTI-PHOSPHOLIPID ANTIBODY SYNDROME (HCC): Primary | ICD-10-CM

## 2022-07-21 LAB — POC INR: 2.3 (ref 0.8–1.2)

## 2022-07-21 PROCEDURE — 36416 COLLJ CAPILLARY BLOOD SPEC: CPT | Performed by: PHARMACIST

## 2022-07-21 PROCEDURE — 85610 PROTHROMBIN TIME: CPT | Performed by: PHARMACIST

## 2022-07-21 PROCEDURE — 99211 OFF/OP EST MAY X REQ PHY/QHP: CPT | Performed by: PHARMACIST

## 2022-07-21 NOTE — PROGRESS NOTES
Medication Management [address]  [phone] (phone)  [phone] (fax)    Ms. Lynann Hodgkin is a 46 y.o.  female with history of Antiphospholipid Syndrome who presents today for anticoagulation monitoring and adjustment. Patient verifies current dosing regimen and tablet strength. No missed or extra doses. Patient denies s/s bleeding/bruising/swelling/SOB/chest pain  No blood in urine or stool. No dietary changes. No changes in medication/OTC agents/Herbals. No change in alcohol use or tobacco use. No change in activity level. Patient denies headaches/dizziness/lightheadedness/falls. No vomiting/diarrhea or acute illness. No Procedures scheduled in the future at this time. Assessment:   Lab Results   Component Value Date    INR 2.30 (H) 07/21/2022    INR 2.40 (H) 07/07/2022    INR 2.70 (H) 06/14/2022     INR therapeutic   Recent Labs     07/21/22  1358   INR 2.30*         Plan:  Continue Coumadin 7.5mg daily. Recheck INR in 2 week(s) - suggested 3 week recheck, pt did not want to wait that long. Patient reminded to call the Anticoagulation Clinic with any signs or symptoms of bleeding or with any medication changes. Patient given instructions utilizing the teach back method. After visit summary printed and reviewed with patient. Discharged ambulatory in no apparent distress.       For Pharmacy Admin Tracking Only      Time Spent (min): 20

## 2022-08-11 DIAGNOSIS — F41.1 GAD (GENERALIZED ANXIETY DISORDER): ICD-10-CM

## 2022-08-11 RX ORDER — ALPRAZOLAM 0.25 MG/1
0.25 TABLET ORAL 3 TIMES DAILY PRN
Qty: 90 TABLET | Refills: 0 | Status: SHIPPED | OUTPATIENT
Start: 2022-08-11 | End: 2022-09-12 | Stop reason: SDUPTHER

## 2022-08-11 RX ORDER — EPINEPHRINE 0.3 MG/.3ML
0.3 INJECTION SUBCUTANEOUS ONCE
Qty: 0.3 ML | Refills: 0 | Status: SHIPPED | OUTPATIENT
Start: 2022-08-11 | End: 2022-08-11

## 2022-08-16 ENCOUNTER — HOSPITAL ENCOUNTER (OUTPATIENT)
Dept: PHARMACY | Age: 52
Setting detail: THERAPIES SERIES
Discharge: HOME | End: 2022-08-16
Payer: MEDICARE

## 2022-08-16 DIAGNOSIS — Z51.81 ENCOUNTER FOR THERAPEUTIC DRUG MONITORING: ICD-10-CM

## 2022-08-16 DIAGNOSIS — D68.61 ANTI-PHOSPHOLIPID ANTIBODY SYNDROME (HCC): Primary | ICD-10-CM

## 2022-08-16 DIAGNOSIS — Z79.01 ANTICOAGULATED ON WARFARIN: ICD-10-CM

## 2022-08-16 LAB — POC INR: 3.1 (ref 0.8–1.2)

## 2022-08-16 PROCEDURE — 36416 COLLJ CAPILLARY BLOOD SPEC: CPT

## 2022-08-16 PROCEDURE — 85610 PROTHROMBIN TIME: CPT

## 2022-08-16 PROCEDURE — 99211 OFF/OP EST MAY X REQ PHY/QHP: CPT

## 2022-08-26 ENCOUNTER — TELEPHONE (OUTPATIENT)
Dept: PHARMACY | Age: 52
End: 2022-08-26

## 2022-08-26 NOTE — TELEPHONE ENCOUNTER
Patient called and reported she had a tooth extraction yesterday evening. States she is still having issues with her bleeding/oozing from the site of the extraction and has saturated the gauze she is using the pack the site. Patient denies headache, lightheadedness, dizziness, or vision changes. Reports she has never had bleeding like this after a single tooth extraction in the past. Recommended patient contact dental office that performed the extraction with these concerns and educated patient on when to seek urgent medical attention for symptoms of further blood loss. Patient expressed verbal understanding and also stated she felt uncomfortable taking her warfarin today due to this oozing. Informed patient that holding her warfarin for one day may not stop the bleeding and she should still be evaluated if the bleeding/oozing continues. Patient stated she is going to hold her warfarin today and monitor the oozing, will seek attention if she develops further symptoms. Patient's next anticoag clinic visit on 8/29/22    Britni ClineD  8/26/2022 10:09 AM

## 2022-08-31 ENCOUNTER — HOSPITAL ENCOUNTER (OUTPATIENT)
Dept: PHARMACY | Age: 52
Setting detail: THERAPIES SERIES
Discharge: HOME | End: 2022-08-31
Payer: MEDICARE

## 2022-08-31 DIAGNOSIS — Z51.81 ENCOUNTER FOR THERAPEUTIC DRUG MONITORING: ICD-10-CM

## 2022-08-31 DIAGNOSIS — Z79.01 ANTICOAGULATED ON WARFARIN: ICD-10-CM

## 2022-08-31 DIAGNOSIS — D68.61 ANTI-PHOSPHOLIPID ANTIBODY SYNDROME (HCC): Primary | ICD-10-CM

## 2022-08-31 LAB — POC INR: 1.8 (ref 0.8–1.2)

## 2022-08-31 PROCEDURE — 36416 COLLJ CAPILLARY BLOOD SPEC: CPT

## 2022-08-31 PROCEDURE — 99212 OFFICE O/P EST SF 10 MIN: CPT

## 2022-08-31 PROCEDURE — 85610 PROTHROMBIN TIME: CPT

## 2022-09-11 DIAGNOSIS — D68.61 ANTI-PHOSPHOLIPID ANTIBODY SYNDROME (HCC): ICD-10-CM

## 2022-09-12 ENCOUNTER — PATIENT MESSAGE (OUTPATIENT)
Dept: FAMILY MEDICINE CLINIC | Age: 52
End: 2022-09-12

## 2022-09-12 DIAGNOSIS — F41.1 GAD (GENERALIZED ANXIETY DISORDER): ICD-10-CM

## 2022-09-12 RX ORDER — WARFARIN SODIUM 7.5 MG/1
7.5 TABLET ORAL DAILY
Qty: 30 TABLET | Refills: 3 | Status: SHIPPED | OUTPATIENT
Start: 2022-09-12

## 2022-09-12 RX ORDER — ALPRAZOLAM 0.25 MG/1
0.25 TABLET ORAL 3 TIMES DAILY PRN
Qty: 90 TABLET | Refills: 0 | Status: SHIPPED | OUTPATIENT
Start: 2022-09-12 | End: 2022-10-11 | Stop reason: SDUPTHER

## 2022-09-12 NOTE — TELEPHONE ENCOUNTER
From: Sanjay Flor  To: Jenna Quinonez  Sent: 9/12/2022 7:46 AM EDT  Subject: Medication     Good morning I'm sorry to bother you my xanax was going to need refilled that I have called in to [facility] on [address]. And also my 7.5 mg of coumadin I get at [facility]. I just like to leave those as is so I'm not hopping pharmacies. Thamk you

## 2022-09-19 ENCOUNTER — HOSPITAL ENCOUNTER (OUTPATIENT)
Dept: PHARMACY | Age: 52
Setting detail: THERAPIES SERIES
Discharge: HOME | End: 2022-09-19
Payer: MEDICARE

## 2022-09-19 DIAGNOSIS — Z79.01 ANTICOAGULATED ON WARFARIN: ICD-10-CM

## 2022-09-19 DIAGNOSIS — Z51.81 ENCOUNTER FOR THERAPEUTIC DRUG MONITORING: ICD-10-CM

## 2022-09-19 DIAGNOSIS — D68.61 ANTI-PHOSPHOLIPID ANTIBODY SYNDROME (HCC): Primary | ICD-10-CM

## 2022-09-19 LAB — POC INR: 2.9 (ref 0.8–1.2)

## 2022-09-19 PROCEDURE — 36416 COLLJ CAPILLARY BLOOD SPEC: CPT

## 2022-09-19 PROCEDURE — 85610 PROTHROMBIN TIME: CPT

## 2022-09-19 PROCEDURE — 99211 OFF/OP EST MAY X REQ PHY/QHP: CPT

## 2022-09-19 NOTE — PROGRESS NOTES
Medication Management [address] [place] [address]  [phone] (phone)  [phone] (fax)    Ms. Jessica Klein is a 46 y.o.  female with history of Antiphospholipid Syndrome who presents today for anticoagulation monitoring and adjustment. Patient verifies current dosing regimen and tablet strength. No missed or extra doses. Patient denies s/s bleeding/bruising/swelling/SOB/chest pain  No blood in urine or stool. No dietary changes. No changes in medication/OTC agents/Herbals.-On Keflex for a UTI  No change in alcohol use or tobacco use. No change in activity level. Patient denies headaches/dizziness/lightheadedness/falls. No vomiting/diarrhea or acute illness. No Procedures scheduled in the future at this time. Assessment:   Lab Results   Component Value Date    INR 2.90 (H) 09/19/2022    INR 1.80 (H) 08/31/2022    INR 3.10 (H) 08/16/2022     INR therapeutic   Recent Labs     09/19/22  1031   INR 2.90*         Plan:  Continue Coumadin 7.5 mg daily. Recheck INR in 3 week(s). Patient reminded to call the Anticoagulation Clinic with any signs or symptoms of bleeding or with any medication changes. Patient given instructions utilizing the teach back method. After visit summary printed and reviewed with patient. Discharged ambulatory in no apparent distress.       For Pharmacy Admin Tracking Only      Time Spent (min): [address] [place] [address], Demetria, BCPS  9/19/2022  10:53 AM

## 2022-10-06 ENCOUNTER — HOSPITAL ENCOUNTER (OUTPATIENT)
Dept: PHARMACY | Age: 52
Setting detail: THERAPIES SERIES
Discharge: HOME | End: 2022-10-06
Payer: MEDICARE

## 2022-10-06 DIAGNOSIS — Z51.81 ENCOUNTER FOR THERAPEUTIC DRUG MONITORING: ICD-10-CM

## 2022-10-06 DIAGNOSIS — D68.61 ANTI-PHOSPHOLIPID ANTIBODY SYNDROME (HCC): Primary | ICD-10-CM

## 2022-10-06 DIAGNOSIS — Z79.01 ANTICOAGULATED ON WARFARIN: ICD-10-CM

## 2022-10-06 LAB — POC INR: 1.9 (ref 0.8–1.2)

## 2022-10-06 PROCEDURE — 99211 OFF/OP EST MAY X REQ PHY/QHP: CPT

## 2022-10-06 PROCEDURE — 85610 PROTHROMBIN TIME: CPT

## 2022-10-06 PROCEDURE — 36416 COLLJ CAPILLARY BLOOD SPEC: CPT

## 2022-10-06 NOTE — PROGRESS NOTES
Medication Management [address]  [phone] (phone)  [phone] (fax)    Ms. Zuleika Eli is a 46 y.o.  female with history of Antiphospholipid Syndrome who presents today for anticoagulation monitoring and adjustment. Patient verifies current dosing regimen and tablet strength. No missed or extra doses. Patient denies s/s bleeding/bruising/swelling/SOB/chest pain  No blood in urine or stool. No dietary changes. - More salads lately (iceberg lettuce) and broccoli   No changes in medication/OTC agents/Herbals. No change in alcohol use or tobacco use. No change in activity level. Patient denies headaches/dizziness/lightheadedness/falls. - Slight dizziness today, felt a little numb  No vomiting/diarrhea or acute illness. No Procedures scheduled in the future at this time. Assessment:   Lab Results   Component Value Date    INR 1.90 (H) 10/06/2022    INR 2.90 (H) 09/19/2022    INR 1.80 (H) 08/31/2022     INR subtherapeutic   Recent Labs     10/06/22  1309   INR 1.90*     Patient interview completed and discussed with pharmacist by   Joyce Tinoco PharmD Candidate 2023  10/6/2022 1:16 PM      Plan:  11.25 mg Coumadin today then continue Coumadin 7.5 mg daily. Recheck INR in 2 week(s). Patient reminded to call the Anticoagulation Clinic with any signs or symptoms of bleeding or with any medication changes. Patient given instructions utilizing the teach back method. After visit summary printed and reviewed with patient. Discharged ambulatory in no apparent distress.     For Pharmacy Admin Tracking Only    Intervention Detail: Dose Adjustment: 1, reason: Improve Adherence  Total # of Interventions Recommended: 1  Total # of Interventions Accepted: 1  Time Spent (min): 15

## 2022-10-10 DIAGNOSIS — F41.1 GAD (GENERALIZED ANXIETY DISORDER): ICD-10-CM

## 2022-10-11 RX ORDER — ALPRAZOLAM 0.25 MG/1
0.25 TABLET ORAL 3 TIMES DAILY PRN
Qty: 90 TABLET | Refills: 0 | Status: SHIPPED | OUTPATIENT
Start: 2022-10-11 | End: 2022-11-10

## 2022-10-13 ENCOUNTER — OFFICE VISIT (OUTPATIENT)
Dept: FAMILY MEDICINE CLINIC | Age: 52
End: 2022-10-13
Payer: MEDICARE

## 2022-10-13 VITALS
SYSTOLIC BLOOD PRESSURE: 100 MMHG | OXYGEN SATURATION: 98 % | WEIGHT: 131 LBS | HEIGHT: 67 IN | HEART RATE: 64 BPM | DIASTOLIC BLOOD PRESSURE: 68 MMHG | BODY MASS INDEX: 20.56 KG/M2 | TEMPERATURE: 97 F | RESPIRATION RATE: 16 BRPM

## 2022-10-13 DIAGNOSIS — R53.83 OTHER FATIGUE: Primary | ICD-10-CM

## 2022-10-13 DIAGNOSIS — F41.1 GAD (GENERALIZED ANXIETY DISORDER): ICD-10-CM

## 2022-10-13 PROCEDURE — G8427 DOCREV CUR MEDS BY ELIG CLIN: HCPCS | Performed by: NURSE PRACTITIONER

## 2022-10-13 PROCEDURE — 3017F COLORECTAL CA SCREEN DOC REV: CPT | Performed by: NURSE PRACTITIONER

## 2022-10-13 PROCEDURE — G8484 FLU IMMUNIZE NO ADMIN: HCPCS | Performed by: NURSE PRACTITIONER

## 2022-10-13 PROCEDURE — G8420 CALC BMI NORM PARAMETERS: HCPCS | Performed by: NURSE PRACTITIONER

## 2022-10-13 PROCEDURE — 99215 OFFICE O/P EST HI 40 MIN: CPT | Performed by: NURSE PRACTITIONER

## 2022-10-13 PROCEDURE — 1036F TOBACCO NON-USER: CPT | Performed by: NURSE PRACTITIONER

## 2022-10-13 ASSESSMENT — PATIENT HEALTH QUESTIONNAIRE - PHQ9
2. FEELING DOWN, DEPRESSED OR HOPELESS: 0
SUM OF ALL RESPONSES TO PHQ QUESTIONS 1-9: 0
SUM OF ALL RESPONSES TO PHQ QUESTIONS 1-9: 0
1. LITTLE INTEREST OR PLEASURE IN DOING THINGS: 0
SUM OF ALL RESPONSES TO PHQ9 QUESTIONS 1 & 2: 0
SUM OF ALL RESPONSES TO PHQ QUESTIONS 1-9: 0
SUM OF ALL RESPONSES TO PHQ QUESTIONS 1-9: 0

## 2022-10-13 NOTE — PROGRESS NOTES
Abhilash Miller is a 46 y.o. female thatpresents for Follow-up (Medication refills)      History obtained today from Patient. Follow up for anxiety  Still taking Xanax, it is still helping   Has been under a lot of stress lately with her divorce and issues with her son  Appetite is decreased  Not sleeping well, but also working a lot of extra shifts  Feels like Xanax is controlling her symptoms well, would like to continue it  Notes more fatigue, but feels it could be stress related, would like to have labs checked  Denies any chest pain or sob    I have reviewed the patient's past medical history, past surgical history, allergies,medications, social and family history and I have made updates where appropriate. Past Medical History:   Diagnosis Date    Acute CVA (cerebrovascular accident) [facility]) 1998    Dr. Bennett Shrestha antibody syndrome [facility])     takes Coumadin    Arrhythmia     Disease of blood and blood forming organ     SVT (supraventricular tachycardia) (HCC)        Social History     Tobacco Use    Smoking status: Never    Smokeless tobacco: Never   Substance Use Topics    Alcohol use: No    Drug use: No       History reviewed. No pertinent family history. Review of Systems        PHYSICAL EXAM:  /68   Pulse 64   Temp 97 °F (36.1 °C) (Infrared)   Resp 16   Ht 5' 7\" (1.702 m)   Wt 131 lb (59.4 kg)   SpO2 98%   BMI 20.52 kg/m²     Physical Exam  Constitutional:       Appearance: Normal appearance. HENT:      Head: Normocephalic and atraumatic. Right Ear: External ear normal.      Left Ear: External ear normal.      Nose: Nose normal.      Mouth/Throat:      Mouth: Mucous membranes are moist.   Eyes:      Conjunctiva/sclera: Conjunctivae normal.   Pulmonary:      Effort: Pulmonary effort is normal.   Abdominal:      General: Bowel sounds are normal.      Palpations: Abdomen is soft. Musculoskeletal:         General: Normal range of motion.       Cervical back: Normal range of motion. Skin:     General: Skin is warm and dry. Neurological:      General: No focal deficit present. Mental Status: She is alert and oriented to person, place, and time. Psychiatric:         Mood and Affect: Mood normal.         Behavior: Behavior normal.         Thought Content: Thought content normal.         Judgment: Judgment normal.         ASSESSMENT & PLAN  Lizabeth Rodriguez was seen today for follow-up. Diagnoses and all orders for this visit:    Other fatigue  -     CBC with Auto Differential; Future  -     Comprehensive Metabolic Panel; Future  -     Lipid Panel; Future  -     TSH with Reflex; Future  -     Vitamin D 25 Hydroxy; Future  -     Vitamin B12; Future    KIMANI (generalized anxiety disorder)  -     CBC with Auto Differential; Future  -     Comprehensive Metabolic Panel; Future  -     Lipid Panel; Future  -     TSH with Reflex; Future  -     Vitamin D 25 Hydroxy; Future  -     Vitamin B12; Future    Continue Xanax 0.25mg TID PRN  Recommend she continue counseling  Will check labs    I spent 40+ minutes reviewing previous notes and testing, discussing symptoms, medications and side effects, treatment options with the patient, performing an exam, and developing a plan of care. All questions answered. Patient verbalized understanding. No follow-ups on file. Follow up with me in 6 months    All copied or forwarded information in the progress note was verified by me to be accurate at the time of visit  Patient's past medical, surgical, social and family history were reviewed and updated     All patient questions answered. Patient voiced understanding.

## 2022-10-19 ENCOUNTER — TELEPHONE (OUTPATIENT)
Dept: FAMILY MEDICINE CLINIC | Age: 52
End: 2022-10-19

## 2022-10-19 DIAGNOSIS — R05.1 ACUTE COUGH: ICD-10-CM

## 2022-10-19 DIAGNOSIS — J31.0 RHINOSINUSITIS: Primary | ICD-10-CM

## 2022-10-19 DIAGNOSIS — J32.9 RHINOSINUSITIS: Primary | ICD-10-CM

## 2022-10-19 RX ORDER — CEPHALEXIN 500 MG/1
500 CAPSULE ORAL 2 TIMES DAILY
Qty: 14 CAPSULE | Refills: 0 | Status: SHIPPED | OUTPATIENT
Start: 2022-10-19 | End: 2022-10-26

## 2022-10-19 NOTE — TELEPHONE ENCOUNTER
Regarding: Cold with cough yellow mucus  ----- Message from Sandoval Jaramillo LPN sent at 03/35/9503  3:45 PM EDT -----       ----- Message from PB Denny - CNP sent at 10/19/2022  1:15 PM -----   I was wondering if I could get some keflex. I'm coughing up yellow mucus. I have had this since last Sat. I have so many allergies. I had 2 500 keflex left I did take.

## 2022-10-20 ENCOUNTER — NURSE ONLY (OUTPATIENT)
Dept: LAB | Age: 52
End: 2022-10-20

## 2022-10-20 DIAGNOSIS — R53.83 OTHER FATIGUE: ICD-10-CM

## 2022-10-20 DIAGNOSIS — F41.1 GAD (GENERALIZED ANXIETY DISORDER): ICD-10-CM

## 2022-10-20 LAB
ALBUMIN SERPL-MCNC: 4.7 G/DL (ref 3.5–5.1)
ALP BLD-CCNC: 111 U/L (ref 38–126)
ALT SERPL-CCNC: 16 U/L (ref 11–66)
ANION GAP SERPL CALCULATED.3IONS-SCNC: 13 MEQ/L (ref 8–16)
AST SERPL-CCNC: 19 U/L (ref 5–40)
BILIRUB SERPL-MCNC: 0.3 MG/DL (ref 0.3–1.2)
BUN BLDV-MCNC: 19 MG/DL (ref 7–22)
CALCIUM SERPL-MCNC: 9.9 MG/DL (ref 8.5–10.5)
CHLORIDE BLD-SCNC: 102 MEQ/L (ref 98–111)
CHOLESTEROL, TOTAL: 243 MG/DL (ref 100–199)
CO2: 27 MEQ/L (ref 23–33)
CREAT SERPL-MCNC: 0.9 MG/DL (ref 0.4–1.2)
GFR SERPL CREATININE-BSD FRML MDRD: > 60 ML/MIN/1.73M2
GLUCOSE BLD-MCNC: 86 MG/DL (ref 70–108)
HDLC SERPL-MCNC: 87 MG/DL
LDL CHOLESTEROL CALCULATED: 140 MG/DL
POTASSIUM SERPL-SCNC: 4.4 MEQ/L (ref 3.5–5.2)
SODIUM BLD-SCNC: 142 MEQ/L (ref 135–145)
TOTAL PROTEIN: 7.7 G/DL (ref 6.1–8)
TRIGL SERPL-MCNC: 79 MG/DL (ref 0–199)
TSH SERPL DL<=0.05 MIU/L-ACNC: 2.13 UIU/ML (ref 0.4–4.2)
VITAMIN B-12: 524 PG/ML (ref 211–911)
VITAMIN D 25-HYDROXY: 74 NG/ML (ref 30–100)

## 2022-10-21 ENCOUNTER — NURSE ONLY (OUTPATIENT)
Dept: LAB | Age: 52
End: 2022-10-21

## 2022-10-21 LAB
BASOPHILS # BLD: 0.9 %
BASOPHILS ABSOLUTE: 0.1 THOU/MM3 (ref 0–0.1)
EOSINOPHIL # BLD: 3.4 %
EOSINOPHILS ABSOLUTE: 0.3 THOU/MM3 (ref 0–0.4)
ERYTHROCYTE [DISTWIDTH] IN BLOOD BY AUTOMATED COUNT: 12.3 % (ref 11.5–14.5)
ERYTHROCYTE [DISTWIDTH] IN BLOOD BY AUTOMATED COUNT: 44 FL (ref 35–45)
HCT VFR BLD CALC: 41.9 % (ref 37–47)
HEMOGLOBIN: 13.4 GM/DL (ref 12–16)
IMMATURE GRANS (ABS): 0.06 THOU/MM3 (ref 0–0.07)
IMMATURE GRANULOCYTES: 0.8 %
LYMPHOCYTES # BLD: 14.5 %
LYMPHOCYTES ABSOLUTE: 1.1 THOU/MM3 (ref 1–4.8)
MCH RBC QN AUTO: 30.7 PG (ref 26–33)
MCHC RBC AUTO-ENTMCNC: 32 GM/DL (ref 32.2–35.5)
MCV RBC AUTO: 95.9 FL (ref 81–99)
MONOCYTES # BLD: 7 %
MONOCYTES ABSOLUTE: 0.5 THOU/MM3 (ref 0.4–1.3)
NUCLEATED RED BLOOD CELLS: 0 /100 WBC
PLATELET # BLD: 273 THOU/MM3 (ref 130–400)
PMV BLD AUTO: 12.6 FL (ref 9.4–12.4)
RBC # BLD: 4.37 MILL/MM3 (ref 4.2–5.4)
SEG NEUTROPHILS: 73.4 %
SEGMENTED NEUTROPHILS ABSOLUTE COUNT: 5.7 THOU/MM3 (ref 1.8–7.7)
WBC # BLD: 7.8 THOU/MM3 (ref 4.8–10.8)

## 2022-10-25 ENCOUNTER — HOSPITAL ENCOUNTER (OUTPATIENT)
Dept: PHARMACY | Age: 52
Setting detail: THERAPIES SERIES
Discharge: HOME | End: 2022-10-25
Payer: MEDICARE

## 2022-10-25 DIAGNOSIS — D68.61 ANTI-PHOSPHOLIPID ANTIBODY SYNDROME (HCC): Primary | ICD-10-CM

## 2022-10-25 DIAGNOSIS — Z79.01 ANTICOAGULATED ON WARFARIN: ICD-10-CM

## 2022-10-25 DIAGNOSIS — Z51.81 ENCOUNTER FOR THERAPEUTIC DRUG MONITORING: ICD-10-CM

## 2022-10-25 LAB — POC INR: 2.4 (ref 0.8–1.2)

## 2022-10-25 PROCEDURE — 85610 PROTHROMBIN TIME: CPT

## 2022-10-25 PROCEDURE — 99211 OFF/OP EST MAY X REQ PHY/QHP: CPT

## 2022-10-25 PROCEDURE — 36416 COLLJ CAPILLARY BLOOD SPEC: CPT

## 2022-10-25 NOTE — PROGRESS NOTES
Medication Management [address]  [phone] (phone)  [phone] (fax)    Ms. Chiquita Schirmer is a 46 y.o.  female with history of Antiphospholipid Syndrome who presents today for anticoagulation monitoring and adjustment. Patient verifies current dosing regimen and tablet strength. Missed or extra doses. Missed dose yesterday, 10/24  Patient denies s/s bleeding/bruising/swelling/SOB/chest pain  No blood in urine or stool. No dietary changes. No changes in medication/OTC agents/Herbals. No change in alcohol use or tobacco use. No change in activity level. Wants to start running again, but hasn't started yet  Patient denies headaches/dizziness/lightheadedness/falls. No vomiting/diarrhea or acute illness. No Procedures scheduled in the future at this time. Assessment:   Lab Results   Component Value Date    INR 2.40 (H) 10/25/2022    INR 1.90 (H) 10/06/2022    INR 2.90 (H) 09/19/2022     INR therapeutic   Recent Labs     10/25/22  0929   INR 2.40*     Plan:  Take Coumadin 11.25 mg today, then continue Coumadin 7.5 mg daily. Recheck INR in 3 week(s). Patient reminded to call the Anticoagulation Clinic with any signs or symptoms of bleeding or with any medication changes. Patient given instructions utilizing the teach back method. After visit summary printed and reviewed with patient. Discharged ambulatory in no apparent distress.     Patricia Mac RPh  10/25/2022 9:45 AM     For Pharmacy Admin Tracking Only    Intervention Detail: Dose Adjustment: 1, reason: Therapy Optimization  Total # of Interventions Recommended: 1  Total # of Interventions Accepted: 1  Time Spent (min): 20

## 2022-10-26 ENCOUNTER — TELEPHONE (OUTPATIENT)
Dept: FAMILY MEDICINE CLINIC | Age: 52
End: 2022-10-26

## 2022-10-26 NOTE — TELEPHONE ENCOUNTER
----- Message from PB Chaparro - CNP sent at 10/26/2022  9:57 AM EDT -----  Labs were stable except her total cholesterol was a little elevated and her LDL was elevated. I recommend following a low fat diet, exercise at least 30 min/day 3-5 days a week, try to include foods rich in Omega-3 fatty acids (salmon, walnuts, flaxseed, etc) and soluble fiber (oatmeal, kidney beans, apples, etc) in your diet. If she would want to consider starting a supplement for this she can look into Fish Oil and Red Yeast Rice.

## 2022-10-26 NOTE — TELEPHONE ENCOUNTER
I called and spoke with [facility] and she verbalized understanding and had no questions at this time.

## 2022-11-10 DIAGNOSIS — F41.1 GAD (GENERALIZED ANXIETY DISORDER): ICD-10-CM

## 2022-11-10 RX ORDER — ALPRAZOLAM 0.25 MG/1
0.25 TABLET ORAL 3 TIMES DAILY PRN
Qty: 90 TABLET | Refills: 0 | Status: SHIPPED | OUTPATIENT
Start: 2022-11-10 | End: 2022-12-10

## 2022-11-15 ENCOUNTER — APPOINTMENT (OUTPATIENT)
Dept: PHARMACY | Age: 52
End: 2022-11-15
Payer: MEDICARE

## 2022-11-16 ENCOUNTER — HOSPITAL ENCOUNTER (OUTPATIENT)
Dept: PHARMACY | Age: 52
Setting detail: THERAPIES SERIES
Discharge: HOME | End: 2022-11-16
Payer: MEDICARE

## 2022-11-16 DIAGNOSIS — D68.61 ANTI-PHOSPHOLIPID ANTIBODY SYNDROME (HCC): Primary | ICD-10-CM

## 2022-11-16 DIAGNOSIS — Z51.81 ENCOUNTER FOR THERAPEUTIC DRUG MONITORING: ICD-10-CM

## 2022-11-16 DIAGNOSIS — Z79.01 ANTICOAGULATED ON WARFARIN: ICD-10-CM

## 2022-11-16 LAB — POC INR: 2.6 (ref 0.8–1.2)

## 2022-11-16 PROCEDURE — 85610 PROTHROMBIN TIME: CPT

## 2022-11-16 PROCEDURE — 36416 COLLJ CAPILLARY BLOOD SPEC: CPT

## 2022-11-16 PROCEDURE — 99211 OFF/OP EST MAY X REQ PHY/QHP: CPT

## 2022-11-16 NOTE — PROGRESS NOTES
Medication Management [address]   [phone] (phone)  [phone] (fax)    Ms. Joseline Camacho is a 46 y.o.  female with history of antiphospholipid antibody syndrome, per referral from FAHAD Hardwick, who presents today for Warfarin monitoring and adjustment (3 week visit). Patient verifies current dosing regimen and tablet strength. No missed or extra doses, except for bolus ordered last visit. Patient denies bleeding/bruising/swelling/SOB/chest pain. No blood in urine or stool. No dietary changes. No changes in medication/OTC agents/herbals. No change in alcohol use or tobacco use. No change in activity level. Had more stress with death in family. Patient denies headaches/dizziness/lightheadedness/falls. States only gets dizzy if INR 1.8 or less. No vomiting/diarrhea or acute illness. No procedures scheduled in the future at this time. Assessment:   Lab Results   Component Value Date    INR 2.60 (H) 11/16/2022    INR 2.40 (H) 10/25/2022    INR 1.90 (H) 10/06/2022     INR therapeutic - goal 2-3. Recent Labs     11/16/22  1348   INR 2.60*        Plan:  POCT INR ordered/performed after second attempt/result reviewed. Continue Coumadin 7.5 mg daily PO. Recheck INR in 4 week(s), but patient requested 3 weeks. Patient reminded to call the Anticoagulation Clinic with any signs or symptoms of bleeding or with any medication changes. Patient given instructions utilizing the teach back method. After visit summary printed and reviewed with patient. Discharged ambulatory in no apparent distress.

## 2022-11-17 ENCOUNTER — APPOINTMENT (OUTPATIENT)
Dept: PHARMACY | Age: 52
End: 2022-11-17
Payer: MEDICARE

## 2022-12-08 DIAGNOSIS — F41.1 GAD (GENERALIZED ANXIETY DISORDER): ICD-10-CM

## 2022-12-08 NOTE — TELEPHONE ENCOUNTER
----- Message from Torrey Morales sent at 12/8/2022  3:14 PM EST -----  Regarding: Medication    Hi I was wondering I needed to get my xanax refilled I am taking a as prescribed.  Thank you very much

## 2022-12-09 ENCOUNTER — HOSPITAL ENCOUNTER (OUTPATIENT)
Dept: PHARMACY | Age: 52
Setting detail: THERAPIES SERIES
Discharge: HOME | End: 2022-12-09
Payer: MEDICARE

## 2022-12-09 DIAGNOSIS — Z51.81 ENCOUNTER FOR THERAPEUTIC DRUG MONITORING: ICD-10-CM

## 2022-12-09 DIAGNOSIS — D68.61 ANTI-PHOSPHOLIPID ANTIBODY SYNDROME (HCC): Primary | ICD-10-CM

## 2022-12-09 DIAGNOSIS — Z79.01 ANTICOAGULATED ON WARFARIN: ICD-10-CM

## 2022-12-09 LAB — POC INR: 1.7 (ref 0.8–1.2)

## 2022-12-09 PROCEDURE — 36416 COLLJ CAPILLARY BLOOD SPEC: CPT | Performed by: PHARMACIST

## 2022-12-09 PROCEDURE — 99212 OFFICE O/P EST SF 10 MIN: CPT | Performed by: PHARMACIST

## 2022-12-09 PROCEDURE — 85610 PROTHROMBIN TIME: CPT | Performed by: PHARMACIST

## 2022-12-09 RX ORDER — ALPRAZOLAM 0.25 MG/1
0.25 TABLET ORAL 3 TIMES DAILY PRN
Qty: 90 TABLET | Refills: 0 | Status: SHIPPED | OUTPATIENT
Start: 2022-12-09 | End: 2023-01-08

## 2022-12-09 NOTE — PROGRESS NOTES
Medication Management [address]  [phone] (phone)  [phone] (fax)    Ms. Chiquita Schirmer is a 46 y.o.  female with history of Antiphospholipid Syndrome who presents today for anticoagulation monitoring and adjustment. Patient verifies current dosing regimen and tablet strength. No missed or extra doses. - missed her dose on 12/3, so took 11.25mg on 12/4 on her own. Patient denies s/s bleeding/bruising/swelling/SOB/chest pain  No blood in urine or stool. No dietary changes. No changes in OTC agents/Herbals. - Removed rosuvastatin 10 mg daily from med list  No change in alcohol use or tobacco use. No change in activity level. Patient denies headaches/lightheadedness/falls. - some dizziness yesterday, happens occasionally. No vomiting/diarrhea or acute illness. No Procedures scheduled in the future at this time. Lab Results   Component Value Date    INR 1.70 (H) 12/09/2022    INR 2.60 (H) 11/16/2022    INR 2.40 (H) 10/25/2022     INR subtherapeutic   Recent Labs     12/09/22  1055   INR 1.70*        Goal INR 2.0-3.0    Patient interview completed and discussed with pharmacist by Lyric Linder PharmD Candidate      Plan:  Coumadin 11.25mg today, then continue Coumadin 7.5mg daily. Recheck INR in 1 week(s). Patient reminded to call the Anticoagulation Clinic with any signs or symptoms of bleeding or with any medication changes. Patient given instructions utilizing the teach back method. After visit summary printed and reviewed with patient. Discharged ambulatory in no apparent distress.     For Pharmacy Admin Tracking Only    Intervention Detail: Dose Adjustment: 1, reason: Therapy Optimization  Total # of Interventions Recommended: 1  Total # of Interventions Accepted: 1  Time Spent (min): 20

## 2022-12-12 DIAGNOSIS — F41.1 GAD (GENERALIZED ANXIETY DISORDER): ICD-10-CM

## 2022-12-12 RX ORDER — ALPRAZOLAM 0.25 MG/1
0.25 TABLET ORAL 3 TIMES DAILY PRN
Qty: 90 TABLET | Refills: 0 | Status: SHIPPED | OUTPATIENT
Start: 2022-12-12 | End: 2023-01-11

## 2022-12-13 NOTE — PROGRESS NOTES
Pt called   Was dispensed at [facility], given brand she cannot take, has allergy to additive, pharmacy would not take it back  [facility] has the  she can take   Rx sent there

## 2022-12-14 DIAGNOSIS — I10 HYPERTENSION, UNSPECIFIED TYPE: ICD-10-CM

## 2022-12-14 RX ORDER — ATENOLOL 50 MG/1
TABLET ORAL
Qty: 180 TABLET | Refills: 3 | Status: SHIPPED | OUTPATIENT
Start: 2022-12-14

## 2022-12-15 ENCOUNTER — HOSPITAL ENCOUNTER (OUTPATIENT)
Dept: PHARMACY | Age: 52
Setting detail: THERAPIES SERIES
Discharge: HOME | End: 2022-12-15
Payer: MEDICARE

## 2022-12-15 DIAGNOSIS — Z79.01 ANTICOAGULATED ON WARFARIN: ICD-10-CM

## 2022-12-15 DIAGNOSIS — Z51.81 ENCOUNTER FOR THERAPEUTIC DRUG MONITORING: ICD-10-CM

## 2022-12-15 DIAGNOSIS — D68.61 ANTI-PHOSPHOLIPID ANTIBODY SYNDROME (HCC): Primary | ICD-10-CM

## 2022-12-15 LAB — POC INR: 3.1 (ref 0.8–1.2)

## 2022-12-15 PROCEDURE — 36416 COLLJ CAPILLARY BLOOD SPEC: CPT

## 2022-12-15 PROCEDURE — 85610 PROTHROMBIN TIME: CPT

## 2022-12-15 PROCEDURE — 99211 OFF/OP EST MAY X REQ PHY/QHP: CPT

## 2022-12-15 NOTE — PROGRESS NOTES
Medication Management [address]  [phone] (phone)  [phone] (fax)    Ms. Torrey Morales is a 46 y.o.  female with history of Antiphospholipid Syndrome who presents today for anticoagulation monitoring and adjustment. Patient verifies current dosing regimen and tablet strength. No missed or extra doses. Patient denies s/s bleeding/bruising/swelling/SOB/chest pain. No blood in urine or stool. No dietary changes. No changes in medication/Herbals. No change in alcohol use or tobacco use. No change in activity level. Patient denies headaches/dizziness/lightheadedness/falls. No vomiting/diarrhea or acute illness. No Procedures scheduled in the future at this time. Assessment:   Lab Results   Component Value Date    INR 3.10 (H) 12/15/2022    INR 1.70 (H) 12/09/2022    INR 2.60 (H) 11/16/2022     INR supratherapeutic   Recent Labs     12/15/22  1031   INR 3.10*       Patient interview completed and discussed with pharmacist by JANEY Hall PharmD Candidate [id number]      Plan:  Continue Coumadin 7.5 mg daily. Recheck INR in 2 week(s). Patient reminded to call the Anticoagulation Clinic with any signs or symptoms of bleeding or with any medication changes. Patient given instructions utilizing the teach back method. After visit summary printed and reviewed with patient. Discharged ambulatory in no apparent distress.     For Pharmacy Admin Tracking Only    Time Spent (min): 15

## 2022-12-22 ENCOUNTER — HOSPITAL ENCOUNTER (OUTPATIENT)
Dept: PHARMACY | Age: 52
Setting detail: THERAPIES SERIES
Discharge: HOME | End: 2022-12-22
Payer: MEDICARE

## 2022-12-22 DIAGNOSIS — Z79.01 ANTICOAGULATED ON WARFARIN: ICD-10-CM

## 2022-12-22 DIAGNOSIS — D68.61 ANTI-PHOSPHOLIPID ANTIBODY SYNDROME (HCC): Primary | ICD-10-CM

## 2022-12-22 DIAGNOSIS — Z51.81 ENCOUNTER FOR THERAPEUTIC DRUG MONITORING: ICD-10-CM

## 2022-12-22 LAB — POC INR: 2.8 (ref 0.8–1.2)

## 2022-12-22 PROCEDURE — 36416 COLLJ CAPILLARY BLOOD SPEC: CPT | Performed by: PHARMACIST

## 2022-12-22 PROCEDURE — 85610 PROTHROMBIN TIME: CPT | Performed by: PHARMACIST

## 2022-12-22 PROCEDURE — 99211 OFF/OP EST MAY X REQ PHY/QHP: CPT | Performed by: PHARMACIST

## 2022-12-28 ENCOUNTER — APPOINTMENT (OUTPATIENT)
Dept: PHARMACY | Age: 52
End: 2022-12-28
Payer: MEDICARE

## 2023-01-10 DIAGNOSIS — D68.61 ANTI-PHOSPHOLIPID ANTIBODY SYNDROME (HCC): ICD-10-CM

## 2023-01-10 DIAGNOSIS — F41.1 GAD (GENERALIZED ANXIETY DISORDER): ICD-10-CM

## 2023-01-10 RX ORDER — EPINEPHRINE 0.3 MG/.3ML
0.3 INJECTION SUBCUTANEOUS ONCE
Qty: 0.3 ML | Refills: 0 | Status: SHIPPED | OUTPATIENT
Start: 2023-01-10 | End: 2023-01-10

## 2023-01-10 RX ORDER — WARFARIN SODIUM 7.5 MG/1
7.5 TABLET ORAL DAILY
Qty: 30 TABLET | Refills: 3 | Status: SHIPPED | OUTPATIENT
Start: 2023-01-10

## 2023-01-10 RX ORDER — ALPRAZOLAM 0.25 MG/1
0.25 TABLET ORAL 3 TIMES DAILY PRN
Qty: 90 TABLET | Refills: 0 | Status: SHIPPED | OUTPATIENT
Start: 2023-01-10 | End: 2023-02-09

## 2023-01-19 ENCOUNTER — HOSPITAL ENCOUNTER (OUTPATIENT)
Dept: PHARMACY | Age: 53
Setting detail: THERAPIES SERIES
Discharge: HOME | End: 2023-01-19
Payer: MEDICARE

## 2023-01-19 DIAGNOSIS — Z79.01 ANTICOAGULATED ON WARFARIN: ICD-10-CM

## 2023-01-19 DIAGNOSIS — D68.61 ANTI-PHOSPHOLIPID ANTIBODY SYNDROME (HCC): Primary | ICD-10-CM

## 2023-01-19 DIAGNOSIS — Z51.81 ENCOUNTER FOR THERAPEUTIC DRUG MONITORING: ICD-10-CM

## 2023-01-19 LAB — POC INR: 3.1 (ref 0.8–1.2)

## 2023-01-19 PROCEDURE — 99211 OFF/OP EST MAY X REQ PHY/QHP: CPT | Performed by: PHARMACIST

## 2023-01-19 PROCEDURE — 36416 COLLJ CAPILLARY BLOOD SPEC: CPT | Performed by: PHARMACIST

## 2023-01-19 PROCEDURE — 85610 PROTHROMBIN TIME: CPT | Performed by: PHARMACIST

## 2023-01-19 NOTE — PROGRESS NOTES
Medication Management [address]  [phone] (phone)  [phone] (fax)    Ms. Artis Mcclure is a 46 y.o.  female with history of Antiphospholipid Syndrome who presents today for anticoagulation monitoring and adjustment. Patient verifies current dosing regimen and tablet strength. No missed or extra doses. Patient denies s/s bleeding/bruising/swelling/SOB/chest pain  No blood in urine or stool. No dietary changes. No changes in medication/OTC agents/Herbals. No change in alcohol use or tobacco use. No change in activity level. Patient denies headaches/dizziness/lightheadedness/falls. No vomiting/diarrhea or acute illness. No Procedures scheduled in the future at this time. Assessment:   Lab Results   Component Value Date    INR 3.10 (H) 01/19/2023    INR 2.80 (H) 12/22/2022    INR 3.10 (H) 12/15/2022     INR supratherapeutic   Recent Labs     01/19/23  1325   INR 3.10*         Plan:  Continue Coumadin 7.5mg daily. Recheck INR in 4 week(s). Patient reminded to call the Anticoagulation Clinic with any signs or symptoms of bleeding or with any medication changes. Patient given instructions utilizing the teach back method. After visit summary printed and reviewed with patient. Discharged ambulatory in no apparent distress.       For Pharmacy Admin Tracking Only    Time Spent (min): 20

## 2023-02-07 DIAGNOSIS — F41.1 GAD (GENERALIZED ANXIETY DISORDER): ICD-10-CM

## 2023-02-07 RX ORDER — ALPRAZOLAM 0.25 MG/1
0.25 TABLET ORAL 3 TIMES DAILY PRN
Qty: 90 TABLET | Refills: 0 | Status: SHIPPED | OUTPATIENT
Start: 2023-02-07 | End: 2023-03-09

## 2023-02-08 ENCOUNTER — HOSPITAL ENCOUNTER (OUTPATIENT)
Dept: PHARMACY | Age: 53
Setting detail: THERAPIES SERIES
Discharge: HOME | End: 2023-02-08
Payer: MEDICARE

## 2023-02-08 DIAGNOSIS — Z79.01 ANTICOAGULATED ON WARFARIN: ICD-10-CM

## 2023-02-08 DIAGNOSIS — Z51.81 ENCOUNTER FOR THERAPEUTIC DRUG MONITORING: ICD-10-CM

## 2023-02-08 DIAGNOSIS — D68.61 ANTI-PHOSPHOLIPID ANTIBODY SYNDROME (HCC): Primary | ICD-10-CM

## 2023-02-08 LAB — POC INR: 3.9 (ref 0.8–1.2)

## 2023-02-08 PROCEDURE — 99211 OFF/OP EST MAY X REQ PHY/QHP: CPT

## 2023-02-08 PROCEDURE — 85610 PROTHROMBIN TIME: CPT

## 2023-02-08 PROCEDURE — 36416 COLLJ CAPILLARY BLOOD SPEC: CPT

## 2023-02-08 NOTE — PROGRESS NOTES
Medication Management [address]  [phone] (phone)  [phone] (fax)    Ms. Cyrilla Ahumada is a 46 y.o.  female with history of Antiphospholipid Syndrome who presents today for anticoagulation monitoring and adjustment. Patient verifies current dosing regimen and tablet strength. Patient states she could not remember if she took her dose on Sunday 2/5/23. She took an extra 1/2 tablet just in case she did not take it. Suspect patient actually took 1.5 tablets that day by accident. Patient denies s/s bleeding/bruising/swelling/SOB/chest pain  No blood in urine or stool. No dietary changes. Slightly less overall, no changes to vitamin K foods. No changes in medication/OTC agents/Herbals. No change in alcohol use or tobacco use. No change in activity level. Patient denies headaches/dizziness/lightheadedness/falls. No vomiting/diarrhea or acute illness. No Procedures scheduled in the future at this time. Assessment:   Lab Results   Component Value Date    INR 3.90 (H) 02/08/2023    INR 3.10 (H) 01/19/2023    INR 2.80 (H) 12/22/2022     INR supratherapeutic   Recent Labs     02/08/23  1548   INR 3.90*     INR Goal 2.0-3.0    Plan: Will have patient hold x1 today then continue Coumadin 7.5mg daily. Recheck INR in 1 week(s). Patient reminded to call the Anticoagulation Clinic with any signs or symptoms of bleeding or with any medication changes. Patient given instructions utilizing the teach back method. After visit summary printed and reviewed with patient. Discharged ambulatory in no apparent distress. Bill Botello.  Epifanio Calderon, PharmD  PGY2 Ambulatory Care Pharmacy Resident  2/8/2023 3:59 PM    For Pharmacy Admin Tracking Only    Intervention Detail: Dose Adjustment: 1, reason: Therapy Optimization  Total # of Interventions Recommended: 1  Total # of Interventions Accepted: 1  Time Spent (min): 20

## 2023-02-17 ENCOUNTER — APPOINTMENT (OUTPATIENT)
Dept: PHARMACY | Age: 53
End: 2023-02-17
Payer: MEDICAID

## 2023-02-20 ENCOUNTER — HOSPITAL ENCOUNTER (OUTPATIENT)
Dept: PHARMACY | Age: 53
Setting detail: THERAPIES SERIES
Discharge: HOME | End: 2023-02-20
Payer: MEDICARE

## 2023-02-20 DIAGNOSIS — Z79.01 ANTICOAGULATED ON WARFARIN: ICD-10-CM

## 2023-02-20 DIAGNOSIS — Z51.81 ENCOUNTER FOR THERAPEUTIC DRUG MONITORING: ICD-10-CM

## 2023-02-20 DIAGNOSIS — D68.61 ANTI-PHOSPHOLIPID ANTIBODY SYNDROME (HCC): Primary | ICD-10-CM

## 2023-02-20 LAB — POC INR: 2.2 (ref 0.8–1.2)

## 2023-02-20 PROCEDURE — 36416 COLLJ CAPILLARY BLOOD SPEC: CPT

## 2023-02-20 PROCEDURE — 85610 PROTHROMBIN TIME: CPT

## 2023-02-20 PROCEDURE — 99211 OFF/OP EST MAY X REQ PHY/QHP: CPT

## 2023-02-20 NOTE — PROGRESS NOTES
Medication Management Clinic  [facility]  Anticoagulation Clinic  [phone] (phone)  [phone] (fax)    Ms. Vane Ferguson is a 52 y.o.  female with history of Antiphospholipid Syndrome who presents today for anticoagulation monitoring and adjustment.    Patient verifies current dosing regimen and tablet strength.  No extra doses. Patient reports missing dose 2/18/23 (7.5 mg)  Patient denies s/s bleeding/bruising/swelling/SOB/chest pain  No blood in urine or stool.  No dietary changes.  No changes in medication/OTC agents/Herbals.  No change in alcohol use or tobacco use.  No change in activity level  Patient denies headaches/dizziness/lightheadedness/falls.  No vomiting/diarrhea or acute illness.   No Procedures scheduled in the future at this time.    Assessment:   Lab Results   Component Value Date    INR 2.20 (H) 02/20/2023    INR 3.90 (H) 02/08/2023    INR 3.10 (H) 01/19/2023     INR therapeutic   Recent Labs     02/20/23  1403   INR 2.20*      Patient interview completed and discussed with pharmacist by Jose RyderD Candidate 2023     Patient is therapeutic today, but has had recent fluctuating INRs due to stress in her life and occasionally missing or taking an incorrect dose of Coumadin.    Plan:  Continue Coumadin 7.5 mg daily.  Recheck INR in 1 week(s) per patient request.  Patient reminded to call the Anticoagulation Clinic with any signs or symptoms of bleeding or with any medication changes.  Patient given instructions utilizing the teach back method.    After visit summary printed and reviewed with patient.      Discharged ambulatory in no apparent distress.     For Pharmacy Admin Tracking Only    Total # of Interventions Recommended: 0  Total # of Interventions Accepted: 0  Time Spent (min): 20    Cecil Medina, BritniD, BCPS  2/20/2023  3:37 PM

## 2023-02-27 ENCOUNTER — HOSPITAL ENCOUNTER (OUTPATIENT)
Dept: PHARMACY | Age: 53
Setting detail: THERAPIES SERIES
Discharge: HOME | End: 2023-02-27
Payer: MEDICAID

## 2023-02-27 DIAGNOSIS — D68.61 ANTI-PHOSPHOLIPID ANTIBODY SYNDROME (HCC): Primary | ICD-10-CM

## 2023-02-27 DIAGNOSIS — Z79.01 ANTICOAGULATED ON WARFARIN: ICD-10-CM

## 2023-02-27 DIAGNOSIS — Z51.81 ENCOUNTER FOR THERAPEUTIC DRUG MONITORING: ICD-10-CM

## 2023-02-27 LAB — POC INR: 4.1 (ref 0.8–1.2)

## 2023-02-27 PROCEDURE — 99212 OFFICE O/P EST SF 10 MIN: CPT

## 2023-02-27 PROCEDURE — 36416 COLLJ CAPILLARY BLOOD SPEC: CPT

## 2023-02-27 PROCEDURE — 85610 PROTHROMBIN TIME: CPT

## 2023-02-27 NOTE — PROGRESS NOTES
Medication Management [address]   [phone] (phone)  [phone] (fax)    Ms. Jovany Sotelo is a 46 y.o.  female with history of Antiphospholipid Syndrome who presents today for anticoagulation monitoring and adjustment. Patient verifies current dosing regimen and tablet strength. No missed or extra doses. Patient denies s/s bleeding/bruising/swelling/SOB/chest pain  No blood in urine or stool.  - Poor appetite d/t stress. Very little food. No changes in medication/OTC agents/Herbals. No change in alcohol use or tobacco use. No change in activity level. Patient denies headaches/dizziness/lightheadedness/falls. No vomiting/diarrhea or acute illness. No Procedures scheduled in the future at this time. - Pt states they have been under stress with mother being hospitalized. Assessment:   Lab Results   Component Value Date    INR 4.10 (H) 2023    INR 2.20 (H) 2023    INR 3.90 (H) 2023     INR supratherapeutic   Recent Labs     23  1356   INR 4.10*     Plan:  Hold Coumadin today, then continue Coumadin 7.5 mg daily. Recheck INR in 1 week(s). Patient reminded to call the Anticoagulation Clinic with any signs or symptoms of bleeding or with any medication changes. Patient given instructions utilizing the teach back method. After visit summary printed and reviewed with patient. Discharged ambulatory in no apparent distress.     For Pharmacy Admin Tracking Only    Intervention Detail: Adherence Monitorin and Dose Adjustment: 1, reason: Therapy Optimization  Total # of Interventions Recommended: 2  Total # of Interventions Accepted: 2  Time Spent (min):  [address], PharmD, [facility]  2023  3:24 PM

## 2023-03-01 ENCOUNTER — APPOINTMENT (OUTPATIENT)
Dept: PHARMACY | Age: 53
End: 2023-03-01
Payer: MEDICAID

## 2023-03-06 DIAGNOSIS — F41.1 GAD (GENERALIZED ANXIETY DISORDER): ICD-10-CM

## 2023-03-06 NOTE — TELEPHONE ENCOUNTER
Recent Visits  Date Type Provider Dept   10/13/22 Office Visit Milagros Mcknight, APRN - CNP [facility] [place] [facility]   05/04/22 Office Visit Milagros Mcknight, APRN - CNP [facility] [place] [facility]   12/21/21 Office Visit Milagros Mcknight, APRN - CNP [facility] [place] [facility]   11/22/21 Office Visit Milagros Mcknight, APRN - CNP [facility] [place] [facility]   10/22/21 Office Visit Milagros Mcknight, APRN - CNP [facility] [place] [facility]   09/24/21 Office Visit Milagros Mcknight, APRN - CNP [facility] [address] recent visits within past 540 days with a meds authorizing provider and meeting all other requirements  Future Appointments  No visits were found meeting these conditions.   Showing future appointments within next 150 days with a meds authorizing provider and meeting all other requirements      Future Appointments   Date Time Provider Francesca Han   3/7/2023  1:40 PM TIAGO Campos [facility] [place]

## 2023-03-07 ENCOUNTER — APPOINTMENT (OUTPATIENT)
Dept: PHARMACY | Age: 53
End: 2023-03-07
Payer: MEDICAID

## 2023-03-07 RX ORDER — ALPRAZOLAM 0.25 MG/1
0.25 TABLET ORAL 3 TIMES DAILY PRN
Qty: 90 TABLET | Refills: 0 | Status: SHIPPED | OUTPATIENT
Start: 2023-03-07 | End: 2023-04-06

## 2023-03-08 ENCOUNTER — HOSPITAL ENCOUNTER (OUTPATIENT)
Dept: PHARMACY | Age: 53
Setting detail: THERAPIES SERIES
Discharge: HOME | End: 2023-03-08
Payer: MEDICAID

## 2023-03-08 DIAGNOSIS — Z51.81 ENCOUNTER FOR THERAPEUTIC DRUG MONITORING: ICD-10-CM

## 2023-03-08 DIAGNOSIS — Z79.01 ANTICOAGULATED ON WARFARIN: ICD-10-CM

## 2023-03-08 DIAGNOSIS — D68.61 ANTI-PHOSPHOLIPID ANTIBODY SYNDROME (HCC): Primary | ICD-10-CM

## 2023-03-08 LAB — POC INR: 3.6 (ref 0.8–1.2)

## 2023-03-08 PROCEDURE — 36416 COLLJ CAPILLARY BLOOD SPEC: CPT

## 2023-03-08 PROCEDURE — 99212 OFFICE O/P EST SF 10 MIN: CPT

## 2023-03-08 PROCEDURE — 85610 PROTHROMBIN TIME: CPT

## 2023-03-08 NOTE — PROGRESS NOTES
Medication Management [address]   [phone] (phone)  [phone] (fax)    Ms. Cristi Bragg is a 46 y.o.  female with history of Antiphospholipid Syndrome who presents today for anticoagulation monitoring and adjustment. Patient verifies current dosing regimen and tablet strength. No missed or extra doses. Patient denies s/s bleeding/bruising/swelling/SOB/chest pain  No blood in urine or stool. Dietary changes. - Eating less than usual  No changes in OTC agents/Herbals. - Takes atenolol differently. Takes 25 mg BID  No change in alcohol use or tobacco use. No change in activity level. Patient denies dizziness/lightheadedness/falls. - Had headaches yesterday. Took ibuprofen   No vomiting/diarrhea or acute illness. No Procedures scheduled in the future at this time. Increased stress as mom is very sick. Assessment:   Lab Results   Component Value Date    INR 3.60 (H) 2023    INR 4.10 (H) 2023    INR 2.20 (H) 2023     INR supratherapeutic   Recent Labs     23  1001   INR 3.60*      INR Goal 2.0 - 3.0   Patient interview completed and discussed with pharmacist by [facility] Leandro Watson       Plan:  Coumadin 3.75 mg today and tomorrow, then decrease Coumadin 3.75 mg on , 7.5 mg all other days (~7.1% decrease in dose). Recheck INR in 5 day(s). Patient reminded to call the Anticoagulation Clinic with signs or symptoms of bleeding or with any medication changes. Patient given instructions utilizing the teach back method. After visit summary printed and reviewed with patient. Discharged ambulatory in no apparent distress.     For Pharmacy Admin Tracking Only    Intervention Detail: Adherence Monitorin and Dose Adjustment: 1, reason: Therapy Optimization  Total # of Interventions Recommended: 2  Total # of Interventions Accepted: 2  Time Spent (min): 20    Kiran Legacy, PharmD, [facility]  3/8/2023  12:07 PM

## 2023-03-09 ENCOUNTER — TELEPHONE (OUTPATIENT)
Dept: FAMILY MEDICINE CLINIC | Age: 53
End: 2023-03-09

## 2023-03-09 DIAGNOSIS — D68.61 ANTI-PHOSPHOLIPID ANTIBODY SYNDROME (HCC): Primary | ICD-10-CM

## 2023-03-09 NOTE — TELEPHONE ENCOUNTER
----- Message from Ebony Boggs RN sent at 3/9/2023 10:32 AM EST -----  Please renewal annual referral for Anticoagulation Monitoring, #[phone]. Thanks, L.  Cherylene Highland, RN BSN

## 2023-03-13 ENCOUNTER — HOSPITAL ENCOUNTER (OUTPATIENT)
Dept: PHARMACY | Age: 53
Setting detail: THERAPIES SERIES
Discharge: HOME | End: 2023-03-13
Payer: MEDICAID

## 2023-03-13 DIAGNOSIS — Z79.01 ANTICOAGULATED ON WARFARIN: ICD-10-CM

## 2023-03-13 DIAGNOSIS — D68.61 ANTI-PHOSPHOLIPID ANTIBODY SYNDROME (HCC): Primary | ICD-10-CM

## 2023-03-13 DIAGNOSIS — Z51.81 ENCOUNTER FOR THERAPEUTIC DRUG MONITORING: ICD-10-CM

## 2023-03-13 LAB — POC INR: 3.3 (ref 0.8–1.2)

## 2023-03-13 PROCEDURE — 99212 OFFICE O/P EST SF 10 MIN: CPT

## 2023-03-13 PROCEDURE — 85610 PROTHROMBIN TIME: CPT

## 2023-03-13 PROCEDURE — 36416 COLLJ CAPILLARY BLOOD SPEC: CPT

## 2023-03-13 NOTE — PROGRESS NOTES
Medication Management [address]   [phone] (phone)  [phone] (fax)    Ms. Tara Simmons is a 46 y.o.  female with history of Antiphospholipid Syndrome who presents today for anticoagulation monitoring and adjustment. Patient verifies current dosing regimen and tablet strength. No missed or extra doses. Patient denies s/s bleeding/bruising/swelling/SOB/chest pain  No blood in urine or stool. No dietary changes. No changes in medication/OTC agents/Herbals. No change in alcohol use or tobacco use. No change in activity level. Patient denies headaches/dizziness/lightheadedness/falls. No vomiting/diarrhea or acute illness. No Procedures scheduled in the future at this time. Assessment:   Lab Results   Component Value Date    INR 3.30 (H) 03/13/2023    INR 3.60 (H) 03/08/2023    INR 4.10 (H) 02/27/2023     INR supratherapeutic   Recent Labs     03/13/23  1512   INR 3.30*     Patient interview completed and discussed with pharmacist by Britni Avalos D Candidate 2023    Patient has been consistently supratherapeutic recently and remains supratherapeutic despite a recent dose adjustment. Plan:  Decrease Coumadin from 3.75 mg W and 7.5 mg [id number] to Coumadin 3.75 mg MF and 7.5 mg TuWThSaSu (7.7% decrease). Recheck INR in 1 week(s). Patient reminded to call the Anticoagulation Clinic with signs or symptoms of bleeding or with any medication changes. Patient given instructions utilizing the teach back method. After visit summary printed and reviewed with patient. Discharged ambulatory in no apparent distress.     For Pharmacy Admin Tracking Only    Intervention Detail: Dose Adjustment: 1, reason: Therapy Optimization  Total # of Interventions Recommended: 1  Total # of Interventions Accepted: 1  Time Spent (min): [address], Demetria, BCPS  3/13/2023  4:34 PM

## 2023-03-20 ENCOUNTER — HOSPITAL ENCOUNTER (OUTPATIENT)
Dept: PHARMACY | Age: 53
Setting detail: THERAPIES SERIES
Discharge: HOME | End: 2023-03-20
Payer: MEDICAID

## 2023-03-20 DIAGNOSIS — Z79.01 ANTICOAGULATED ON WARFARIN: ICD-10-CM

## 2023-03-20 DIAGNOSIS — D68.61 ANTI-PHOSPHOLIPID ANTIBODY SYNDROME (HCC): Primary | ICD-10-CM

## 2023-03-20 DIAGNOSIS — Z51.81 ENCOUNTER FOR THERAPEUTIC DRUG MONITORING: ICD-10-CM

## 2023-03-20 LAB — POC INR: 2.8 (ref 0.8–1.2)

## 2023-03-20 PROCEDURE — 99211 OFF/OP EST MAY X REQ PHY/QHP: CPT

## 2023-03-20 PROCEDURE — 85610 PROTHROMBIN TIME: CPT

## 2023-03-20 PROCEDURE — 36416 COLLJ CAPILLARY BLOOD SPEC: CPT

## 2023-03-20 NOTE — PROGRESS NOTES
Medication Management [address]   [phone] (phone)  [phone] (fax)    Ms. Nadege Jones is a 46 y.o.  female with history of Antiphospholipid Syndrome who presents today for anticoagulation monitoring and adjustment. Patient verifies current dosing regimen and tablet strength. No missed or extra doses. Patient denies s/s bleeding/bruising/swelling/SOB/chest pain  No blood in urine or stool. No dietary changes. No changes in medication/OTC agents/Herbals. No change in alcohol use or tobacco use. No change in activity level. Patient denies headaches/dizziness/lightheadedness/falls. No vomiting/diarrhea or acute illness. No Procedures scheduled in the future at this time. Assessment:   Lab Results   Component Value Date    INR 2.80 (H) 03/20/2023    INR 3.30 (H) 03/13/2023    INR 3.60 (H) 03/08/2023     INR therapeutic   Recent Labs     03/20/23  1553   INR 2.80*     Patient interview completed and discussed with pharmacist by Darnell Bridges Pharm D Candidate [id number]      Patient presents with first therapeutic INR following a recent dose adjustment. Plan:  Continue Coumadin 3.75 mg MF and 7.5 mg TuWThSaSu. Recheck INR in 10 day(s). Patient reminded to call the Anticoagulation Clinic with any signs or symptoms of bleeding or with any medication changes. Patient given instructions utilizing the teach back method. After visit summary printed and reviewed with patient. Discharged ambulatory in no apparent distress.     For Pharmacy Admin Tracking Only    Total # of Interventions Recommended: 0  Total # of Interventions Accepted: 0  Time Spent (min): 4200  [place], PharmD, BCPS  3/20/2023  4:22 PM

## 2023-03-22 ENCOUNTER — APPOINTMENT (OUTPATIENT)
Dept: PHARMACY | Age: 53
End: 2023-03-22
Payer: MEDICAID

## 2023-03-29 RX ORDER — VERAPAMIL HYDROCHLORIDE 240 MG/1
240 TABLET, FILM COATED, EXTENDED RELEASE ORAL 2 TIMES DAILY
Qty: 180 TABLET | Refills: 3 | Status: SHIPPED | OUTPATIENT
Start: 2023-03-29

## 2023-03-29 NOTE — TELEPHONE ENCOUNTER
----- Message from Domenica Graf sent at 3/29/2023  1:31 PM EDT -----  Regarding: Verapamil   Verapamil 240 bid To [facility] I'm not seeing doctor Lawyer Meneses until. May.  This twice a day and I don't see the cardiologist until may seventeenth Thank you i'm sorry to bother you

## 2023-03-31 ENCOUNTER — HOSPITAL ENCOUNTER (OUTPATIENT)
Dept: PHARMACY | Age: 53
Setting detail: THERAPIES SERIES
Discharge: HOME | End: 2023-03-31
Payer: MEDICAID

## 2023-03-31 DIAGNOSIS — Z79.01 ANTICOAGULATED ON WARFARIN: ICD-10-CM

## 2023-03-31 DIAGNOSIS — Z51.81 ENCOUNTER FOR THERAPEUTIC DRUG MONITORING: ICD-10-CM

## 2023-03-31 DIAGNOSIS — D68.61 ANTI-PHOSPHOLIPID ANTIBODY SYNDROME (HCC): Primary | ICD-10-CM

## 2023-03-31 LAB — POC INR: 2.8 (ref 0.8–1.2)

## 2023-03-31 PROCEDURE — 99211 OFF/OP EST MAY X REQ PHY/QHP: CPT | Performed by: PHARMACIST

## 2023-03-31 PROCEDURE — 36416 COLLJ CAPILLARY BLOOD SPEC: CPT | Performed by: PHARMACIST

## 2023-03-31 PROCEDURE — 85610 PROTHROMBIN TIME: CPT | Performed by: PHARMACIST

## 2023-03-31 NOTE — PROGRESS NOTES
Medication Management [address]  [phone] (phone)  [phone] (fax)    Ms. Anatoliy Reid is a 46 y.o.  female with history of Antiphospholipid Syndrome who presents today for anticoagulation monitoring and adjustment. Patient verifies current dosing regimen and tablet strength. No missed or extra doses. Patient denies s/s bleeding/bruising/swelling/SOB/chest pain  No blood in urine or stool. No dietary changes. No changes in medication/OTC agents/Herbals. No change in alcohol use or tobacco use. No change in activity level. Patient denies headaches/dizziness/lightheadedness/falls. No vomiting/diarrhea or acute illness. No Procedures scheduled in the future at this time. Assessment:   Lab Results   Component Value Date    INR 2.80 (H) 03/31/2023    INR 2.80 (H) 03/20/2023    INR 3.30 (H) 03/13/2023     INR therapeutic   Recent Labs     03/31/23  0841   INR 2.80*         Plan:  Continue Coumadin 3.75mg MF and 7.5mg TWThSaS. Recheck INR in 1 week(s) per patient request.  Patient reminded to call the Anticoagulation Clinic with any signs or symptoms of bleeding or with any medication changes. Patient given instructions utilizing the teach back method. After visit summary printed and reviewed with patient. Discharged ambulatory in no apparent distress.       For Pharmacy Admin Tracking Only    Time Spent (min): 20

## 2023-04-04 DIAGNOSIS — F41.1 GAD (GENERALIZED ANXIETY DISORDER): ICD-10-CM

## 2023-04-04 DIAGNOSIS — I10 HYPERTENSION, UNSPECIFIED TYPE: ICD-10-CM

## 2023-04-04 RX ORDER — ATENOLOL 50 MG/1
50 TABLET ORAL 2 TIMES DAILY
Qty: 180 TABLET | Refills: 3 | OUTPATIENT
Start: 2023-04-04

## 2023-04-04 RX ORDER — ALPRAZOLAM 0.25 MG/1
0.25 TABLET ORAL 3 TIMES DAILY PRN
Qty: 90 TABLET | Refills: 0 | Status: SHIPPED | OUTPATIENT
Start: 2023-04-04 | End: 2023-04-07 | Stop reason: SDUPTHER

## 2023-04-04 RX ORDER — EPINEPHRINE 0.3 MG/.3ML
0.3 INJECTION SUBCUTANEOUS ONCE
Qty: 0.3 ML | Refills: 0 | Status: SHIPPED | OUTPATIENT
Start: 2023-04-04 | End: 2023-04-07

## 2023-04-07 ENCOUNTER — HOSPITAL ENCOUNTER (OUTPATIENT)
Dept: PHARMACY | Age: 53
Setting detail: THERAPIES SERIES
Discharge: HOME | End: 2023-04-07
Payer: MEDICAID

## 2023-04-07 DIAGNOSIS — F41.1 GAD (GENERALIZED ANXIETY DISORDER): ICD-10-CM

## 2023-04-07 DIAGNOSIS — D68.61 ANTI-PHOSPHOLIPID ANTIBODY SYNDROME (HCC): Primary | ICD-10-CM

## 2023-04-07 DIAGNOSIS — Z79.01 ANTICOAGULATED ON WARFARIN: ICD-10-CM

## 2023-04-07 DIAGNOSIS — Z51.81 ENCOUNTER FOR THERAPEUTIC DRUG MONITORING: ICD-10-CM

## 2023-04-07 LAB — POC INR: 2.7 (ref 0.8–1.2)

## 2023-04-07 PROCEDURE — 99211 OFF/OP EST MAY X REQ PHY/QHP: CPT

## 2023-04-07 PROCEDURE — 36416 COLLJ CAPILLARY BLOOD SPEC: CPT

## 2023-04-07 PROCEDURE — 85610 PROTHROMBIN TIME: CPT

## 2023-04-07 RX ORDER — ALPRAZOLAM 0.25 MG/1
0.25 TABLET ORAL 3 TIMES DAILY PRN
Qty: 90 TABLET | Refills: 0 | Status: SHIPPED | OUTPATIENT
Start: 2023-04-07 | End: 2023-05-07

## 2023-04-07 NOTE — PROGRESS NOTES
Medication Management [address]  [phone] (phone)  [phone] (fax)    Ms. Genesis Winston is a 46 y.o.  female with history of Antiphospholipid Syndrome who presents today for anticoagulation monitoring and adjustment. Patient verifies current dosing regimen and tablet strength. No missed or extra doses. Patient denies s/s bleeding/bruising/swelling/SOB/chest pain  No blood in urine or stool. No dietary changes. No changes in medication/OTC agents/Herbals. No change in alcohol use or tobacco use. No change in activity level. Patient denies headaches/dizziness/lightheadedness/falls. No vomiting/diarrhea or acute illness. No Procedures scheduled in the future at this time. Assessment:   Lab Results   Component Value Date    INR 2.70 (H) 04/07/2023    INR 2.80 (H) 03/31/2023    INR 2.80 (H) 03/20/2023     INR therapeutic   Recent Labs     04/07/23  1043   INR 2.70*     Plan:  Continue Coumadin 3.75mg MF and 7.5mg TWThSS. Recheck INR in 1.5 week(s). Patient reminded to call the Anticoagulation Clinic with any signs or symptoms of bleeding or with any medication changes. Patient given instructions utilizing the teach back method. After visit summary printed and reviewed with patient. Discharged ambulatory in no apparent distress.       For Pharmacy Admin Tracking Only  Time Spent (min): 20

## 2023-04-19 ENCOUNTER — PATIENT MESSAGE (OUTPATIENT)
Dept: FAMILY MEDICINE CLINIC | Age: 53
End: 2023-04-19

## 2023-04-19 ENCOUNTER — HOSPITAL ENCOUNTER (OUTPATIENT)
Dept: PHARMACY | Age: 53
Setting detail: THERAPIES SERIES
Discharge: HOME | End: 2023-04-19
Payer: MEDICAID

## 2023-04-19 DIAGNOSIS — Z79.01 ANTICOAGULATED ON WARFARIN: ICD-10-CM

## 2023-04-19 DIAGNOSIS — Z51.81 ENCOUNTER FOR THERAPEUTIC DRUG MONITORING: ICD-10-CM

## 2023-04-19 DIAGNOSIS — R53.83 OTHER FATIGUE: Primary | ICD-10-CM

## 2023-04-19 DIAGNOSIS — D68.61 ANTI-PHOSPHOLIPID ANTIBODY SYNDROME (HCC): Primary | ICD-10-CM

## 2023-04-19 LAB — POC INR: 2.2 (ref 0.8–1.2)

## 2023-04-19 PROCEDURE — 36416 COLLJ CAPILLARY BLOOD SPEC: CPT

## 2023-04-19 PROCEDURE — 99211 OFF/OP EST MAY X REQ PHY/QHP: CPT

## 2023-04-19 PROCEDURE — 85610 PROTHROMBIN TIME: CPT

## 2023-04-19 NOTE — PROGRESS NOTES
Medication Management [address]  [phone] (phone)  [phone] (fax)    Ms. Shazia Aguirre is a 46 y.o.  female with history of Antiphospholipid Syndrome who presents today for anticoagulation monitoring and adjustment. Patient verifies current dosing regimen and tablet strength. No missed or extra doses. Patient denies s/s bleeding/bruising/swelling/SOB/chest pain  No blood in urine or stool. Dietary changes. - Appetite getting better since mom passed away. Changes in medication/OTC agents/Herbals. - Removed clonidine from med list as patient is not taking this. No change in alcohol use or tobacco use. Change in activity level. - Little more active. Now riding her bike. Patient denies headaches/lightheadedness/falls. - Dizzy today  No vomiting/diarrhea or acute illness. Procedures scheduled in the future at this time. - D&C on 5/18/23. In past procedures, they have not stopped Coumadin    Assessment:   Lab Results   Component Value Date    INR 2.20 (H) 04/19/2023    INR 2.70 (H) 04/07/2023    INR 2.80 (H) 03/31/2023     INR therapeutic   Recent Labs     04/19/23  1452   INR 2.20*     Plan:  Continue Coumadin 3.75 mg MF, 7.5 mg all other days. Recheck INR in 10 day(s) per patient choice. Patient reminded to call the Anticoagulation Clinic with any signs or symptoms of bleeding or with any medication changes. Patient given instructions utilizing the teach back method. After visit summary printed and reviewed with patient. Discharged ambulatory in no apparent distress.     For Pharmacy Admin Tracking Only  Time Spent (min): [address], PharmD, BCPS  4/19/2023  4:33 PM

## 2023-04-27 ENCOUNTER — NURSE ONLY (OUTPATIENT)
Dept: LAB | Age: 53
End: 2023-04-27

## 2023-04-27 DIAGNOSIS — R53.83 OTHER FATIGUE: ICD-10-CM

## 2023-04-27 LAB
ALBUMIN SERPL BCG-MCNC: 4.4 G/DL (ref 3.5–5.1)
ALP SERPL-CCNC: 109 U/L (ref 38–126)
ALT SERPL W/O P-5'-P-CCNC: 21 U/L (ref 11–66)
ANION GAP SERPL CALC-SCNC: 14 MEQ/L (ref 8–16)
AST SERPL-CCNC: 24 U/L (ref 5–40)
BASOPHILS ABSOLUTE: 0.1 THOU/MM3 (ref 0–0.1)
BASOPHILS NFR BLD AUTO: 0.8 %
BILIRUB SERPL-MCNC: 0.2 MG/DL (ref 0.3–1.2)
BUN SERPL-MCNC: 24 MG/DL (ref 7–22)
CALCIUM SERPL-MCNC: 9.6 MG/DL (ref 8.5–10.5)
CHLORIDE SERPL-SCNC: 108 MEQ/L (ref 98–111)
CO2 SERPL-SCNC: 24 MEQ/L (ref 23–33)
CREAT SERPL-MCNC: 0.8 MG/DL (ref 0.4–1.2)
DEPRECATED RDW RBC AUTO: 43.1 FL (ref 35–45)
EOSINOPHIL NFR BLD AUTO: 4.1 %
EOSINOPHILS ABSOLUTE: 0.3 THOU/MM3 (ref 0–0.4)
ERYTHROCYTE [DISTWIDTH] IN BLOOD BY AUTOMATED COUNT: 12.1 % (ref 11.5–14.5)
GFR SERPL CREATININE-BSD FRML MDRD: > 60 ML/MIN/1.73M2
GLUCOSE SERPL-MCNC: 79 MG/DL (ref 70–108)
HCT VFR BLD AUTO: 43.8 % (ref 37–47)
HGB BLD-MCNC: 14.4 GM/DL (ref 12–16)
IMM GRANULOCYTES # BLD AUTO: 0.01 THOU/MM3 (ref 0–0.07)
IMM GRANULOCYTES NFR BLD AUTO: 0.2 %
LYMPHOCYTES ABSOLUTE: 1.1 THOU/MM3 (ref 1–4.8)
LYMPHOCYTES NFR BLD AUTO: 17.7 %
MCH RBC QN AUTO: 31.9 PG (ref 26–33)
MCHC RBC AUTO-ENTMCNC: 32.9 GM/DL (ref 32.2–35.5)
MCV RBC AUTO: 97.1 FL (ref 81–99)
MONOCYTES ABSOLUTE: 0.5 THOU/MM3 (ref 0.4–1.3)
MONOCYTES NFR BLD AUTO: 7.8 %
NEUTROPHILS NFR BLD AUTO: 69.4 %
NRBC BLD AUTO-RTO: 0 /100 WBC
PLATELET # BLD AUTO: 258 THOU/MM3 (ref 130–400)
PMV BLD AUTO: 12.6 FL (ref 9.4–12.4)
POTASSIUM SERPL-SCNC: 4.4 MEQ/L (ref 3.5–5.2)
PROT SERPL-MCNC: 7 G/DL (ref 6.1–8)
RBC # BLD AUTO: 4.51 MILL/MM3 (ref 4.2–5.4)
SEGMENTED NEUTROPHILS ABSOLUTE COUNT: 4.4 THOU/MM3 (ref 1.8–7.7)
SODIUM SERPL-SCNC: 146 MEQ/L (ref 135–145)
WBC # BLD AUTO: 6.3 THOU/MM3 (ref 4.8–10.8)

## 2023-04-28 ENCOUNTER — TELEPHONE (OUTPATIENT)
Dept: FAMILY MEDICINE CLINIC | Age: 53
End: 2023-04-28

## 2023-04-28 NOTE — TELEPHONE ENCOUNTER
I called and spoke to Trace Arora and she verbalized understanding and had no questions at this time.

## 2023-05-02 ENCOUNTER — APPOINTMENT (OUTPATIENT)
Dept: PHARMACY | Age: 53
End: 2023-05-02
Payer: MEDICAID

## 2023-05-09 ENCOUNTER — APPOINTMENT (OUTPATIENT)
Dept: PHARMACY | Age: 53
End: 2023-05-09
Payer: MEDICAID

## 2023-05-12 ENCOUNTER — APPOINTMENT (OUTPATIENT)
Dept: PHARMACY | Age: 53
End: 2023-05-12
Payer: MEDICAID

## 2023-05-14 ENCOUNTER — TELEPHONE (OUTPATIENT)
Dept: PHARMACY | Age: 53
End: 2023-05-14

## 2023-05-17 ENCOUNTER — HOSPITAL ENCOUNTER (OUTPATIENT)
Dept: PHARMACY | Age: 53
Setting detail: THERAPIES SERIES
Discharge: HOME | End: 2023-05-17
Payer: MEDICAID

## 2023-05-17 DIAGNOSIS — Z79.01 ANTICOAGULATED ON WARFARIN: ICD-10-CM

## 2023-05-17 DIAGNOSIS — Z51.81 ENCOUNTER FOR THERAPEUTIC DRUG MONITORING: ICD-10-CM

## 2023-05-17 DIAGNOSIS — D68.61 ANTI-PHOSPHOLIPID ANTIBODY SYNDROME (HCC): Primary | ICD-10-CM

## 2023-05-17 LAB — POC INR: 2.9 (ref 0.8–1.2)

## 2023-05-17 PROCEDURE — 85610 PROTHROMBIN TIME: CPT

## 2023-05-17 PROCEDURE — 99212 OFFICE O/P EST SF 10 MIN: CPT

## 2023-05-17 PROCEDURE — 36416 COLLJ CAPILLARY BLOOD SPEC: CPT

## 2023-05-17 PROCEDURE — 99211 OFF/OP EST MAY X REQ PHY/QHP: CPT

## 2023-05-17 RX ORDER — WARFARIN SODIUM 7.5 MG/1
TABLET ORAL
COMMUNITY

## 2023-05-17 RX ORDER — WARFARIN SODIUM 7.5 MG/1
TABLET ORAL
Qty: 30 TABLET | Refills: 3 | Status: SHIPPED | OUTPATIENT
Start: 2023-05-17

## 2023-05-17 NOTE — PROGRESS NOTES
Medication Management [address] [place] [address]  [phone] (phone)  [phone] (fax)    Ms. Kim Guo is a 46 y.o.  female with history of antiphospholipid antibody syndrome, per referral from FAHAD Dsouza, who presents today for Warfarin monitoring and adjustment (3 weeks late for 1.5 week visit; no show 4/28). Patient verifies current dosing regimen and tablet strength. Patient spoke with clinic pharmacist 5/14: had taken 7.5 mg, instead of 3.75 mg 5/12, then missed 7.5 mg dose next day. Took 11.25 mg 5/14, as directed. Patient denies  bleeding/bruising/swelling/SOB/chest pain. No blood in urine or stool. No dietary changes. Changes in medication/OTC agents/herbals: Dr. Aidee Hale decreased Tenormin to 12.5 mg BID today, but still has 50 mg tablet. No change in alcohol use or tobacco use. No change in activity level. Patient denies headaches/dizziness/lightheadedness/falls. No vomiting/diarrhea or acute illness. Procedures scheduled in the future at this time: [facility]  tomorrow - surgeon did not require hold, but leaving up to this clinic's discretion if INR high, patient reports. States she saw Dr. Aidee Hale today, cardiologist.  Reports Dr. Aidee Hale is fine with holding Coumadin tonight if deemed necessary. Savanna Hermosillo RPh., PharmD., with Britni BocanegraD. Resident, PGY2, reviewed surgery guidelines; no Coumadin hold required. Assessment:     Lab Results   Component Value Date    INR 2.90 (H) 05/17/2023    INR 2.20 (H) 04/19/2023    INR 2.70 (H) 04/07/2023     INR therapeutic - goal 2-3. Recent Labs     05/17/23  1531   INR 2.90*      Plan:  POCT INR performed/result reviewed. Continue PO Coumadin 3.75 mg MF, 7.5 mg TWThSS. Recheck INR in 2 week(s). (Report given to JAE Peng, PharmD. - no new orders; she renewed prescription electronically.)  Patient reminded to call the Anticoagulation Clinic with any signs or symptoms of bleeding or with any

## 2023-06-06 ENCOUNTER — HOSPITAL ENCOUNTER (OUTPATIENT)
Dept: PHARMACY | Age: 53
Setting detail: THERAPIES SERIES
Discharge: HOME | End: 2023-06-06
Payer: MEDICAID

## 2023-06-06 DIAGNOSIS — Z79.01 ANTICOAGULATED ON WARFARIN: ICD-10-CM

## 2023-06-06 DIAGNOSIS — Z51.81 ENCOUNTER FOR THERAPEUTIC DRUG MONITORING: ICD-10-CM

## 2023-06-06 DIAGNOSIS — D68.61 ANTI-PHOSPHOLIPID ANTIBODY SYNDROME (HCC): Primary | ICD-10-CM

## 2023-06-06 LAB — POC INR: 2 (ref 0.8–1.2)

## 2023-06-06 PROCEDURE — 85610 PROTHROMBIN TIME: CPT | Performed by: PHARMACIST

## 2023-06-06 PROCEDURE — 36416 COLLJ CAPILLARY BLOOD SPEC: CPT | Performed by: PHARMACIST

## 2023-06-06 PROCEDURE — 99211 OFF/OP EST MAY X REQ PHY/QHP: CPT | Performed by: PHARMACIST

## 2023-06-06 NOTE — PROGRESS NOTES
Medication Management [address]  [phone] (phone)  [phone] (fax)    Ms. Dale Drew is a 46 y.o.  female with history of Antiphospholipid Syndrome who presents today for anticoagulation monitoring and adjustment. Patient verifies current dosing regimen and tablet strength. No missed or extra doses. Patient denies s/s bleeding/bruising/swelling/SOB/chest pain  No blood in urine or stool. Back to normal diet, had previously been not eating much. No changes in medication/OTC agents/Herbals. No change in alcohol use or tobacco use. Running more, plans to continue. Patient denies headaches/dizziness/lightheadedness/falls. No vomiting/diarrhea or acute illness. No Procedures scheduled in the future at this time. Assessment:   Lab Results   Component Value Date    INR 2.00 (H) 06/06/2023    INR 2.90 (H) 05/17/2023    INR 2.20 (H) 04/19/2023     INR therapeutic   Recent Labs     06/06/23  0805   INR 2.00*         Plan:  Increase Coumadin 3.75mg M and 7.5mg all other days. Recheck INR in 2 week(s). Patient reminded to call the Anticoagulation Clinic with signs or symptoms of bleeding or with any medication changes. Patient given instructions utilizing the teach back method. After visit summary printed and reviewed with patient. Discharged ambulatory in no apparent distress.     For Pharmacy Admin Tracking Only    Intervention Detail: Dose Adjustment: 1, reason: Therapy Optimization  Total # of Interventions Recommended: 1  Total # of Interventions Accepted: 1  Time Spent (min): 20

## 2023-06-19 ENCOUNTER — OFFICE VISIT (OUTPATIENT)
Dept: FAMILY MEDICINE CLINIC | Age: 53
End: 2023-06-19
Payer: MEDICARE

## 2023-06-19 ENCOUNTER — TELEPHONE (OUTPATIENT)
Dept: FAMILY MEDICINE CLINIC | Age: 53
End: 2023-06-19

## 2023-06-19 ENCOUNTER — HOSPITAL ENCOUNTER (OUTPATIENT)
Dept: PHARMACY | Age: 53
Setting detail: THERAPIES SERIES
Discharge: HOME | End: 2023-06-19
Payer: MEDICAID

## 2023-06-19 ENCOUNTER — TELEPHONE (OUTPATIENT)
Dept: PHARMACY | Age: 53
End: 2023-06-19

## 2023-06-19 ENCOUNTER — NURSE ONLY (OUTPATIENT)
Dept: LAB | Age: 53
End: 2023-06-19

## 2023-06-19 VITALS
BODY MASS INDEX: 20.25 KG/M2 | WEIGHT: 129 LBS | OXYGEN SATURATION: 96 % | RESPIRATION RATE: 16 BRPM | HEIGHT: 67 IN | DIASTOLIC BLOOD PRESSURE: 72 MMHG | SYSTOLIC BLOOD PRESSURE: 104 MMHG | TEMPERATURE: 97 F | HEART RATE: 57 BPM

## 2023-06-19 DIAGNOSIS — Z51.81 ENCOUNTER FOR THERAPEUTIC DRUG MONITORING: ICD-10-CM

## 2023-06-19 DIAGNOSIS — Z79.01 ANTICOAGULATED ON WARFARIN: ICD-10-CM

## 2023-06-19 DIAGNOSIS — M25.50 DIFFUSE ARTHRALGIA: ICD-10-CM

## 2023-06-19 DIAGNOSIS — F41.1 GAD (GENERALIZED ANXIETY DISORDER): ICD-10-CM

## 2023-06-19 DIAGNOSIS — R53.83 OTHER FATIGUE: Primary | ICD-10-CM

## 2023-06-19 DIAGNOSIS — R53.83 OTHER FATIGUE: ICD-10-CM

## 2023-06-19 DIAGNOSIS — D68.61 ANTI-PHOSPHOLIPID ANTIBODY SYNDROME (HCC): Primary | ICD-10-CM

## 2023-06-19 LAB
25(OH)D3 SERPL-MCNC: 61 NG/ML (ref 30–100)
CRP SERPL-MCNC: < 0.3 MG/DL (ref 0–1)
ERYTHROCYTE [SEDIMENTATION RATE] IN BLOOD BY WESTERGREN METHOD: 6 MM/HR (ref 0–20)
POC INR: 2.8 (ref 0.8–1.2)
RHEUMATOID FACT SERPL-ACNC: 10 IU/ML (ref 0–13)
URATE SERPL-MCNC: 3.8 MG/DL (ref 2.4–5.7)
VIT B12 SERPL-MCNC: 414 PG/ML (ref 211–911)

## 2023-06-19 PROCEDURE — G8427 DOCREV CUR MEDS BY ELIG CLIN: HCPCS | Performed by: NURSE PRACTITIONER

## 2023-06-19 PROCEDURE — 85610 PROTHROMBIN TIME: CPT

## 2023-06-19 PROCEDURE — 99211 OFF/OP EST MAY X REQ PHY/QHP: CPT

## 2023-06-19 PROCEDURE — 1036F TOBACCO NON-USER: CPT | Performed by: NURSE PRACTITIONER

## 2023-06-19 PROCEDURE — G8420 CALC BMI NORM PARAMETERS: HCPCS | Performed by: NURSE PRACTITIONER

## 2023-06-19 PROCEDURE — 3017F COLORECTAL CA SCREEN DOC REV: CPT | Performed by: NURSE PRACTITIONER

## 2023-06-19 PROCEDURE — 36416 COLLJ CAPILLARY BLOOD SPEC: CPT

## 2023-06-19 PROCEDURE — 99215 OFFICE O/P EST HI 40 MIN: CPT | Performed by: NURSE PRACTITIONER

## 2023-06-19 RX ORDER — ALPRAZOLAM 0.25 MG/1
0.25 TABLET ORAL EVERY 6 HOURS PRN
Qty: 120 TABLET | Refills: 0 | Status: SHIPPED | OUTPATIENT
Start: 2023-06-19 | End: 2023-07-19

## 2023-06-19 ASSESSMENT — PATIENT HEALTH QUESTIONNAIRE - PHQ9
1. LITTLE INTEREST OR PLEASURE IN DOING THINGS: 0
SUM OF ALL RESPONSES TO PHQ QUESTIONS 1-9: 1
2. FEELING DOWN, DEPRESSED OR HOPELESS: 1
SUM OF ALL RESPONSES TO PHQ9 QUESTIONS 1 & 2: 1
SUM OF ALL RESPONSES TO PHQ QUESTIONS 1-9: 1

## 2023-06-19 NOTE — PROGRESS NOTES
Medication Management [address]  [phone] (phone)  [phone] (fax)    Ms. Becca Chappell is a 46 y.o.  female with history of Antiphospholipid Syndrome who presents today for anticoagulation monitoring and adjustment. Patient verifies current dosing regimen and tablet strength. No extra doses. Patient states she missed her dose last night 6/19/23 (7.5 mg). Patient denies s/s bleeding/bruising/swelling/SOB/chest pain  No blood in urine or stool. No dietary changes. No changes in medication/Herbals. Patient takes aspirin 325 mg daily PRN. No change in alcohol use or tobacco use. No change in activity level. Patient denies dizziness/lightheadedness/falls. Patient states she had a recent sinus headache. No vomiting/diarrhea or acute illness. No Procedures scheduled in the future at this time. Assessment:   Lab Results   Component Value Date    INR 2.80 (H) 06/19/2023    INR 2.00 (H) 06/06/2023    INR 2.90 (H) 05/17/2023     INR therapeutic   Recent Labs     06/19/23  1510   INR 2.80*     Patient is therapeutic today, but previously had dose increased at last visit. Anticipate patient would have been supratherapeutic if she had not missed a dose. Previously, patient was stable on 45 mg/week since the middle of March 2023. Plan:  Decrease Coumadin from 3.75 mg M and 7.5 mg TuWThFSaSu to Coumadin 3.75 mg MF and 7.5 mg TuWThSaSu (7.7% decrease). Recheck INR in 10 day(s). Patient reminded to call the Anticoagulation Clinic with signs or symptoms of bleeding or with any medication changes. Patient given instructions utilizing the teach back method. After visit summary printed and reviewed with patient. Discharged ambulatory in no apparent distress.     For Pharmacy Admin Tracking Only    Intervention Detail: Dose Adjustment: 1, reason: Therapy Optimization  Total # of Interventions Recommended: 1  Total # of Interventions Accepted: 1  Time

## 2023-06-19 NOTE — TELEPHONE ENCOUNTER
Noted. Patient coming in for appointment today at 3:00 PM. Will follow-up at that time.     Britni AlbrechtD, BCPS  6/19/2023  8:56 AM

## 2023-06-19 NOTE — PROGRESS NOTES
Darius Boo is a 46 y.o. female thatpresents for Fatigue      History obtained today from Patient. Fatigue    Patient reports fatigue that has been ongoing since her D&C. Symptoms are worse since they initially started. Describes fatigue as general malaise and hypersomnolence, diffuse arthralgia  she describes the severity of the fatigue as severe    Recent medication changes? No  Changes in diet or activity level? Boyfriend says he doesn't think she eats enough, does have a hx of an eating d/o in the past  Fever, chills, night sweats or weight loss? chills  New onset depression or anxiety? Xanax is working, does feel more down  Snoring, apnea, daytime hypersomnolence? Yes - snoring , daytime hypersomnolence, denies any known apneic episodes  Skin/hair changes, cold intolerance, constipation? Cold intolerance  Chest pain, SOB, palpitations? Yes - occasional palpitations   Nausea, vomiting, diarrhea? Yes, nausea but no vomiting or diarrhea    I have reviewed the patient's past medical history, past surgical history, allergies,medications, social and family history and I have made updates where appropriate. Past Medical History:   Diagnosis Date    Acute CVA (cerebrovascular accident) [facility]) 1998    Dr. Shaista Luz antibody syndrome [facility])     takes Coumadin    Arrhythmia     Disease of blood and blood forming organ     SVT (supraventricular tachycardia) (HCC)        Social History     Tobacco Use    Smoking status: Never    Smokeless tobacco: Never   Substance Use Topics    Alcohol use: No    Drug use: No       History reviewed. No pertinent family history. Review of Systems        PHYSICAL EXAM:  /72   Pulse 57   Temp 97 °F (36.1 °C) (Infrared)   Resp 16   Ht 5' 7\" (1.702 m)   Wt 129 lb (58.5 kg)   SpO2 96%   BMI 20.20 kg/m²     Physical Exam  Constitutional:       Appearance: Normal appearance. HENT:      Head: Normocephalic and atraumatic.       Right Ear:

## 2023-06-19 NOTE — TELEPHONE ENCOUNTER
Patient called and left a message that she missed her medication yesterday   Please call patient with instructions.   She has an appointment on 6/20/23

## 2023-06-20 ENCOUNTER — APPOINTMENT (OUTPATIENT)
Dept: PHARMACY | Age: 53
End: 2023-06-20
Payer: MEDICAID

## 2023-06-20 ENCOUNTER — PATIENT MESSAGE (OUTPATIENT)
Dept: FAMILY MEDICINE CLINIC | Age: 53
End: 2023-06-20

## 2023-06-20 DIAGNOSIS — R53.83 OTHER FATIGUE: Primary | ICD-10-CM

## 2023-06-20 NOTE — TELEPHONE ENCOUNTER
From: Dennie Chuck  To: Rd Cisneros  Sent: 6/20/2023 3:03 PM EDT  Subject: Test    Hey I am sorry. Too bad are you? I just had a question. My dad had wondered if I needed to get my white count. Checks like with what you said with a smear or something . .. And then I just wondered if we can Do another c b c on friday just because when I had Blood into my abdominal have any many years ago I was This tired and my hemaglobin started tritating down And it ended up being 6 I hope you're not mad that I asked you.  I just wondered what your thoughts were thank you I hope you're having a good day

## 2023-06-21 ENCOUNTER — TELEPHONE (OUTPATIENT)
Dept: FAMILY MEDICINE CLINIC | Age: 53
End: 2023-06-21

## 2023-06-21 LAB — DSDNA AB TITR SER CLIF: NORMAL {TITER}

## 2023-06-21 NOTE — TELEPHONE ENCOUNTER
Labs at [facility] from 6/18 were relatively stable. We are still awaiting the rest of her lab results that I ordered the day of our visit. Has she been able to repeat the CBC I ordered yesterday? If so can we get the results.

## 2023-06-21 NOTE — TELEPHONE ENCOUNTER
Pt informed and verbalized understanding.    Pt states she is going to get the labs done tomorrow /[facility]    Pt states her dad has leukemia and she is worried b/c she is abnormally tired - she would like to be tested for this

## 2023-06-21 NOTE — TELEPHONE ENCOUNTER
The lab work she is getting done tomorrow is how we evaluate for this. She had this blood work done at [facility] as well and it was negative.

## 2023-06-22 ENCOUNTER — NURSE ONLY (OUTPATIENT)
Dept: LAB | Age: 53
End: 2023-06-22

## 2023-06-22 DIAGNOSIS — I10 HYPERTENSION, UNSPECIFIED TYPE: ICD-10-CM

## 2023-06-22 DIAGNOSIS — R53.83 OTHER FATIGUE: ICD-10-CM

## 2023-06-22 LAB
BASOPHILS ABSOLUTE: 0.1 THOU/MM3 (ref 0–0.1)
BASOPHILS NFR BLD AUTO: 1.6 %
CHOLEST SERPL-MCNC: 229 MG/DL (ref 100–199)
DEPRECATED RDW RBC AUTO: 43.8 FL (ref 35–45)
EOSINOPHIL NFR BLD AUTO: 2.6 %
EOSINOPHILS ABSOLUTE: 0.1 THOU/MM3 (ref 0–0.4)
ERYTHROCYTE [DISTWIDTH] IN BLOOD BY AUTOMATED COUNT: 12.4 % (ref 11.5–14.5)
HCT VFR BLD AUTO: 41.1 % (ref 37–47)
HDLC SERPL-MCNC: 72 MG/DL
HGB BLD-MCNC: 13.1 GM/DL (ref 12–16)
IMM GRANULOCYTES # BLD AUTO: 0.01 THOU/MM3 (ref 0–0.07)
IMM GRANULOCYTES NFR BLD AUTO: 0.2 %
LDLC SERPL CALC-MCNC: 136 MG/DL
LYMPHOCYTES ABSOLUTE: 1.1 THOU/MM3 (ref 1–4.8)
LYMPHOCYTES NFR BLD AUTO: 22.8 %
MCH RBC QN AUTO: 30.7 PG (ref 26–33)
MCHC RBC AUTO-ENTMCNC: 31.9 GM/DL (ref 32.2–35.5)
MCV RBC AUTO: 96.3 FL (ref 81–99)
MONOCYTES ABSOLUTE: 0.4 THOU/MM3 (ref 0.4–1.3)
MONOCYTES NFR BLD AUTO: 7.8 %
NEUTROPHILS NFR BLD AUTO: 65 %
NRBC BLD AUTO-RTO: 0 /100 WBC
NUCLEAR IGG SER QL IA: DETECTED
PLATELET # BLD AUTO: 239 THOU/MM3 (ref 130–400)
PMV BLD AUTO: 12.3 FL (ref 9.4–12.4)
RBC # BLD AUTO: 4.27 MILL/MM3 (ref 4.2–5.4)
SEGMENTED NEUTROPHILS ABSOLUTE COUNT: 3.3 THOU/MM3 (ref 1.8–7.7)
TRIGL SERPL-MCNC: 105 MG/DL (ref 0–199)
WBC # BLD AUTO: 5 THOU/MM3 (ref 4.8–10.8)

## 2023-06-22 NOTE — TELEPHONE ENCOUNTER
Pt informed and verbalized understanding.  She will get labs done today    Pt would like to remain out of work and return on Monday  Pt crying states she a nervous wreck , she got the CHERRIE results today and it says detected

## 2023-06-23 ENCOUNTER — TELEPHONE (OUTPATIENT)
Dept: FAMILY MEDICINE CLINIC | Age: 53
End: 2023-06-23

## 2023-06-23 LAB
NUCLEAR IGG SER QL IF: NORMAL
SCLERODERMA (SCL-70) AB: < 0.6 U/ML

## 2023-06-23 NOTE — TELEPHONE ENCOUNTER
Patient called in asking what the next steps. If she needs an appointment, if more lab work is going to be ordered. If you think it is okay for her to go back to work on Monday?

## 2023-06-23 NOTE — TELEPHONE ENCOUNTER
She can go back to work Monday unless she is not feeling better. If she wants to see Rheum about the positive CHERRIE, we can refer her. Keep in mind this can be positive with APS. But if she would like further testing and evaluation since she has been feeling so bad, then I will place a referral to Dr. Juan Oliver. I would also recommend a sleep referral to make sure we are covering all of our bases. Would she be willing to do this? I would also like her to start keeping a food diary so we can see how many calories she is getting in. We can follow up to review this in a couple of weeks.

## 2023-06-23 NOTE — TELEPHONE ENCOUNTER
----- Message from PB Walden CNP sent at 6/22/2023  5:39 PM EDT -----  Still awaiting the rest of her labs. CHERRIE was positive but we this can be seen with APS. Her Vit B12 was only 414. With her feeling so fatigued, I would recommend starting some replacement. This consists of injections daily x 1 week, then weekly x 1 month, then monthly thereafter. Would she want to start this?

## 2023-06-23 NOTE — TELEPHONE ENCOUNTER
Patient informed and verbalized understanding. States she wants to think about the rheumatology referral.  She is okay with the sleep referral and will keep a food diary.   She is requesting a letter stating it is okay for her to return to work on Monday

## 2023-06-23 NOTE — TELEPHONE ENCOUNTER
----- Message from PB Montgomery CNP sent at 6/22/2023  5:35 PM EDT -----  Cbc was stable. Lipid panel was stable. LDL was a little elevated. I recommend following a low fat diet, exercise at least 30 min/day 3-5 days a week, try to include foods rich in Omega-3 fatty acids (salmon, walnuts, flaxseed, etc) and soluble fiber (oatmeal, kidney beans, apples, etc) in your diet. See other result note for rest of results.

## 2023-06-23 NOTE — TELEPHONE ENCOUNTER
Patient informed and verbalized understanding.  Patient will call back with fax number to have it faxed

## 2023-06-28 ENCOUNTER — OFFICE VISIT (OUTPATIENT)
Dept: FAMILY MEDICINE CLINIC | Age: 53
End: 2023-06-28
Payer: MEDICAID

## 2023-06-28 ENCOUNTER — HOSPITAL ENCOUNTER (OUTPATIENT)
Dept: PHARMACY | Age: 53
Setting detail: THERAPIES SERIES
Discharge: HOME | End: 2023-06-28
Payer: MEDICAID

## 2023-06-28 VITALS
SYSTOLIC BLOOD PRESSURE: 102 MMHG | WEIGHT: 130.8 LBS | DIASTOLIC BLOOD PRESSURE: 60 MMHG | HEART RATE: 55 BPM | HEIGHT: 67 IN | RESPIRATION RATE: 16 BRPM | TEMPERATURE: 98.6 F | OXYGEN SATURATION: 98 % | BODY MASS INDEX: 20.53 KG/M2

## 2023-06-28 DIAGNOSIS — R53.83 OTHER FATIGUE: Primary | ICD-10-CM

## 2023-06-28 DIAGNOSIS — D68.61 ANTI-PHOSPHOLIPID ANTIBODY SYNDROME (HCC): ICD-10-CM

## 2023-06-28 DIAGNOSIS — D68.61 ANTI-PHOSPHOLIPID ANTIBODY SYNDROME (HCC): Primary | ICD-10-CM

## 2023-06-28 DIAGNOSIS — Z51.81 ENCOUNTER FOR THERAPEUTIC DRUG MONITORING: ICD-10-CM

## 2023-06-28 DIAGNOSIS — Z79.01 ANTICOAGULATED ON WARFARIN: ICD-10-CM

## 2023-06-28 DIAGNOSIS — M25.50 DIFFUSE ARTHRALGIA: ICD-10-CM

## 2023-06-28 LAB — POC INR: 2 (ref 0.8–1.2)

## 2023-06-28 PROCEDURE — 36416 COLLJ CAPILLARY BLOOD SPEC: CPT

## 2023-06-28 PROCEDURE — 85610 PROTHROMBIN TIME: CPT

## 2023-06-28 PROCEDURE — 99211 OFF/OP EST MAY X REQ PHY/QHP: CPT

## 2023-06-28 PROCEDURE — 99214 OFFICE O/P EST MOD 30 MIN: CPT | Performed by: NURSE PRACTITIONER

## 2023-07-05 DIAGNOSIS — F41.1 GAD (GENERALIZED ANXIETY DISORDER): ICD-10-CM

## 2023-07-05 RX ORDER — ALPRAZOLAM 0.25 MG/1
0.25 TABLET ORAL EVERY 6 HOURS PRN
Qty: 120 TABLET | Refills: 0 | Status: SHIPPED | OUTPATIENT
Start: 2023-07-05 | End: 2023-07-06 | Stop reason: SDUPTHER

## 2023-07-05 NOTE — TELEPHONE ENCOUNTER
Recent Visits  Date Type Provider Dept   06/28/23 Office Visit PB Woodward - CNP [facility]   06/19/23 Office Visit PB Woodward - CNP [facility]   10/13/22 Office Visit Billy Amador APRN - CNP [facility]   05/04/22 Office Visit Billy Amador APRN - CNP [facility]  [address] recent visits within past 540 days with a meds authorizing provider and meeting all other requirements  Future Appointments  No visits were found meeting these conditions.   Showing future appointments within next 150 days with a meds authorizing provider and meeting all other requirements      Future Appointments   Date Time Provider [address]   7/12/2023 11:00 AM García Aldridge[address]   8/21/2023  1:00 PM MD Mary Avilez [facility] [address]

## 2023-07-06 DIAGNOSIS — F41.1 GAD (GENERALIZED ANXIETY DISORDER): ICD-10-CM

## 2023-07-06 RX ORDER — ALPRAZOLAM 0.25 MG/1
0.25 TABLET ORAL EVERY 6 HOURS PRN
Qty: 120 TABLET | Refills: 0 | Status: SHIPPED | OUTPATIENT
Start: 2023-07-06 | End: 2023-08-05

## 2023-07-12 ENCOUNTER — HOSPITAL ENCOUNTER (OUTPATIENT)
Dept: PHARMACY | Age: 53
Setting detail: THERAPIES SERIES
Discharge: HOME | End: 2023-07-12
Payer: COMMERCIAL

## 2023-07-12 ENCOUNTER — TELEPHONE (OUTPATIENT)
Dept: PHARMACY | Age: 53
End: 2023-07-12

## 2023-07-12 DIAGNOSIS — D68.61 ANTI-PHOSPHOLIPID ANTIBODY SYNDROME (HCC): Primary | ICD-10-CM

## 2023-07-12 DIAGNOSIS — Z51.81 ENCOUNTER FOR THERAPEUTIC DRUG MONITORING: ICD-10-CM

## 2023-07-12 DIAGNOSIS — Z79.01 ANTICOAGULATED ON WARFARIN: ICD-10-CM

## 2023-07-12 LAB — POC INR: 2.4 (ref 0.8–1.2)

## 2023-07-12 PROCEDURE — 85610 PROTHROMBIN TIME: CPT

## 2023-07-12 PROCEDURE — 36416 COLLJ CAPILLARY BLOOD SPEC: CPT

## 2023-07-12 PROCEDURE — 99211 OFF/OP EST MAY X REQ PHY/QHP: CPT

## 2023-07-12 NOTE — PROGRESS NOTES
Medication Management [address] [place] [address]  [phone] (phone)  [phone] (fax)    Ms. Nat Capellan is a 46 y.o.  female with history of antiphospholipid antibody syndrome, per referral from FAHAD Kaur, who presents today for Warfarin monitoring and adjustment (2 week visit after increasing dose by 3.75 mg/week - third change in a row). Patient verifies current dosing regimen and tablet strength. No missed or extra doses. Patient denies  bleeding/bruising/swelling/SOB. No dietary changes. No changes in medication/OTC agents/herbals, except for using more Xanax. No change in alcohol use or tobacco use. No change in activity level. Patient denies falls. No vomiting/diarrhea or acute illness. No procedures scheduled in the future at this time. Assessment:     Lab Results   Component Value Date    INR 2.40 (H) 07/12/2023    INR 2.00 (H) 06/28/2023    INR 2.80 (H) 06/19/2023     INR therapeutic - goal 2-3. Recent Labs     07/12/23  1103   INR 2.40*      Plan:  POCT INR performed/result reviewed. Continue PO Coumadin 3.75 mg M, 7.5 mg TWThFSS. Recheck INR in 3 week(s). (Report given - orders received from/verified with Charity Galdamez, PharmD.)  Patient, however, requested 2 weeks. Patient reminded to call the Anticoagulation Clinic with any signs or symptoms of bleeding or with any medication changes. Patient given instructions utilizing the teach back method. After visit summary printed and reviewed with patient. Discharged ambulatory in no apparent distress.     For Pharmacy Admin Tracking Only    Time Spent (min):  21

## 2023-07-13 NOTE — TELEPHONE ENCOUNTER
Patient called [facility]'s main pharmacy with concerns that she was not sure if she took her dose today or not. Patient was seen in clinic today with an INR of 2.40 and given instructions to continue her regimen of Coumadin 3.75 mg M, 7.5 mg TWThFSS. Per patient, she clots very quickly and she is worried to miss a dose because she states that her INR drops quickly. Instructed patient to take Coumadin 5 mg x 1 and continue her home dose. Rescheduled patient to be seen earlier on July 18, 2023.      Trae Spicer, Demetria, [facility]  7/12/2023  8:28 PM

## 2023-07-21 ENCOUNTER — HOSPITAL ENCOUNTER (OUTPATIENT)
Dept: PHARMACY | Age: 53
Setting detail: THERAPIES SERIES
Discharge: HOME | End: 2023-07-21
Payer: COMMERCIAL

## 2023-07-21 DIAGNOSIS — Z79.01 ANTICOAGULATED ON WARFARIN: ICD-10-CM

## 2023-07-21 DIAGNOSIS — Z51.81 ENCOUNTER FOR THERAPEUTIC DRUG MONITORING: ICD-10-CM

## 2023-07-21 DIAGNOSIS — D68.61 ANTI-PHOSPHOLIPID ANTIBODY SYNDROME (HCC): Primary | ICD-10-CM

## 2023-07-21 LAB — POC INR: 2.3 (ref 0.8–1.2)

## 2023-07-21 PROCEDURE — 36416 COLLJ CAPILLARY BLOOD SPEC: CPT | Performed by: PHARMACIST

## 2023-07-21 PROCEDURE — 85610 PROTHROMBIN TIME: CPT | Performed by: PHARMACIST

## 2023-07-21 PROCEDURE — 99211 OFF/OP EST MAY X REQ PHY/QHP: CPT | Performed by: PHARMACIST

## 2023-07-21 NOTE — PROGRESS NOTES
Medication Management [address] [place] [address]  [phone] (phone)  [phone] (fax)    Ms. Coleen Silvestre is a 46 y.o.  female with history of  antiphospholipid antibody syndrome  who presents today for anticoagulation monitoring and adjustment. Patient verifies current dosing regimen and tablet strength. No missed or extra doses. Called on 7/12, instructed take 5 mg x 1 then continue home dose  Patient denies s/s bleeding/bruising/swelling/SOB  No blood in urine or stool. No dietary changes. No changes in medication/OTC agents/Herbals. No change in alcohol use or tobacco use. No change in activity level. Patient denies falls. No vomiting/diarrhea or acute illness. No Procedures scheduled in the future at this time. Assessment:   Lab Results   Component Value Date    INR 2.30 (H) 07/21/2023    INR 2.40 (H) 07/12/2023    INR 2.00 (H) 06/28/2023     INR therapeutic   Recent Labs     07/21/23  1034   INR 2.30*     Patient interview completed and discussed with pharmacist by KAHLIL Crenshaw Student    Plan:  Continue Coumadin 3.75mg M and 7.5mg all other days. Recheck INR in 2 week(s) as requested by pt. Patient reminded to call the Anticoagulation Clinic with any signs or symptoms of bleeding or with any medication changes. Patient given instructions utilizing the teach back method. After visit summary printed and reviewed with patient. Discharged ambulatory in no apparent distress.       For Pharmacy Admin Tracking Only  Time Spent (min): 20

## 2023-07-25 ENCOUNTER — OFFICE VISIT (OUTPATIENT)
Dept: FAMILY MEDICINE CLINIC | Age: 53
End: 2023-07-25
Payer: COMMERCIAL

## 2023-07-25 VITALS
DIASTOLIC BLOOD PRESSURE: 76 MMHG | HEART RATE: 65 BPM | TEMPERATURE: 97.9 F | HEIGHT: 67 IN | WEIGHT: 131.2 LBS | BODY MASS INDEX: 20.59 KG/M2 | OXYGEN SATURATION: 98 % | SYSTOLIC BLOOD PRESSURE: 114 MMHG | RESPIRATION RATE: 16 BRPM

## 2023-07-25 DIAGNOSIS — R05.1 ACUTE COUGH: ICD-10-CM

## 2023-07-25 DIAGNOSIS — R00.0 TACHYCARDIA: ICD-10-CM

## 2023-07-25 DIAGNOSIS — L30.9 ECZEMA, UNSPECIFIED TYPE: ICD-10-CM

## 2023-07-25 DIAGNOSIS — R06.2 WHEEZING: ICD-10-CM

## 2023-07-25 DIAGNOSIS — J40 BRONCHITIS: Primary | ICD-10-CM

## 2023-07-25 PROCEDURE — 99214 OFFICE O/P EST MOD 30 MIN: CPT | Performed by: NURSE PRACTITIONER

## 2023-07-25 PROCEDURE — G8420 CALC BMI NORM PARAMETERS: HCPCS | Performed by: NURSE PRACTITIONER

## 2023-07-25 PROCEDURE — 1036F TOBACCO NON-USER: CPT | Performed by: NURSE PRACTITIONER

## 2023-07-25 PROCEDURE — G8427 DOCREV CUR MEDS BY ELIG CLIN: HCPCS | Performed by: NURSE PRACTITIONER

## 2023-07-25 PROCEDURE — 3017F COLORECTAL CA SCREEN DOC REV: CPT | Performed by: NURSE PRACTITIONER

## 2023-07-25 RX ORDER — CEPHALEXIN 500 MG/1
500 CAPSULE ORAL 3 TIMES DAILY
Qty: 21 CAPSULE | Refills: 0 | Status: SHIPPED | OUTPATIENT
Start: 2023-07-25 | End: 2023-08-01

## 2023-07-25 RX ORDER — EPINEPHRINE 0.3 MG/.3ML
0.3 INJECTION SUBCUTANEOUS ONCE
Qty: 0.3 ML | Refills: 0 | Status: SHIPPED | OUTPATIENT
Start: 2023-07-25 | End: 2023-07-25

## 2023-07-25 RX ORDER — CLOBETASOL PROPIONATE 0.5 MG/G
OINTMENT TOPICAL 2 TIMES DAILY
Qty: 60 G | Refills: 0 | Status: SHIPPED | OUTPATIENT
Start: 2023-07-25

## 2023-07-25 RX ORDER — ALBUTEROL SULFATE 90 UG/1
2 AEROSOL, METERED RESPIRATORY (INHALATION) EVERY 4 HOURS PRN
Qty: 18 G | Refills: 0 | Status: SHIPPED | OUTPATIENT
Start: 2023-07-25

## 2023-07-25 NOTE — TELEPHONE ENCOUNTER
Recent Visits  Date Type Provider Dept   06/28/23 Office Visit PB Lopez CNP [facility]   06/19/23 Office Visit PB Lopez CNP [facility]   10/13/22 Office Visit PB Lopez - CNP [facility]   05/04/22 Office Visit Naomi Holland APRN - CNP [facility]  [address] recent visits within past 540 days with a meds authorizing provider and meeting all other requirements  Future Appointments  No visits were found meeting these conditions.   Showing future appointments within next 150 days with a meds authorizing provider and meeting all other requirements

## 2023-08-04 ENCOUNTER — HOSPITAL ENCOUNTER (OUTPATIENT)
Dept: PHARMACY | Age: 53
Setting detail: THERAPIES SERIES
Discharge: HOME | End: 2023-08-04
Payer: COMMERCIAL

## 2023-08-04 DIAGNOSIS — D68.61 ANTI-PHOSPHOLIPID ANTIBODY SYNDROME (HCC): Primary | ICD-10-CM

## 2023-08-04 DIAGNOSIS — Z79.01 ANTICOAGULATED ON WARFARIN: ICD-10-CM

## 2023-08-04 DIAGNOSIS — Z51.81 ENCOUNTER FOR THERAPEUTIC DRUG MONITORING: ICD-10-CM

## 2023-08-04 LAB — POC INR: 3.5 (ref 0.8–1.2)

## 2023-08-04 PROCEDURE — 99212 OFFICE O/P EST SF 10 MIN: CPT

## 2023-08-04 PROCEDURE — 36416 COLLJ CAPILLARY BLOOD SPEC: CPT

## 2023-08-04 PROCEDURE — 85610 PROTHROMBIN TIME: CPT

## 2023-08-04 NOTE — PROGRESS NOTES
Medication Management [address] [place] [address]  [phone] (phone)  [phone] (fax)    Ms. Marya Davies is a 46 y.o.  female with history of Antiphospholipid Syndrome who presents today for anticoagulation monitoring and adjustment. Patient verifies current dosing regimen and tablet strength. No missed or extra doses. Patient denies s/s bleeding/bruising/swelling/SOB  No blood in urine or stool. Decreased dietary intake last couple days due to long work hours. States she only ate one meal yesterday during a 15 hr shift. No changes in medication/OTC agents/Herbals. No change in alcohol use or tobacco use. No change in activity level. Increased activity level relating to work demands. Patient denies falls. No vomiting/diarrhea or acute illness. No Procedures scheduled in the future at this time. Assessment:   Lab Results   Component Value Date    INR 3.50 (H) 08/04/2023    INR 2.30 (H) 07/21/2023    INR 2.40 (H) 07/12/2023     INR supratherapeutic   Recent Labs     08/04/23  1055   INR 3.50*     Plan:  Give reduced dose today of 3.75 mg then continue Coumadin 3.75 mg M and 7.5 mg TWThFSS. Recheck INR in 2 week(s). Patient reminded to call the Anticoagulation Clinic with any signs or symptoms of bleeding or with any medication changes. Patient given instructions utilizing the teach back method. Plan reviewed with Jasiel Sellers PharmD    After visit summary printed and reviewed with patient. Discharged ambulatory in no apparent distress.     Britni De GuzmanD    For Pharmacy Admin Tracking Only    Intervention Detail: Dose Adjustment: 1, reason: Therapy Optimization  Total # of Interventions Recommended: 1  Total # of Interventions Accepted: 1  Time Spent (min): 20

## 2023-08-08 ENCOUNTER — PATIENT MESSAGE (OUTPATIENT)
Dept: FAMILY MEDICINE CLINIC | Age: 53
End: 2023-08-08

## 2023-08-08 DIAGNOSIS — F41.1 GAD (GENERALIZED ANXIETY DISORDER): ICD-10-CM

## 2023-08-08 RX ORDER — ALPRAZOLAM 0.25 MG/1
0.25 TABLET ORAL EVERY 6 HOURS PRN
Qty: 120 TABLET | Refills: 0 | Status: SHIPPED | OUTPATIENT
Start: 2023-08-08 | End: 2023-08-09 | Stop reason: SDUPTHER

## 2023-08-08 NOTE — TELEPHONE ENCOUNTER
----- Message from Andrey Roque sent at 8/8/2023  9:50 AM EDT -----  Regarding: Xanax   Contact: [phone]  My xanax will be due to refill at [facility] on the 8th is that ok. Im taking as directed.

## 2023-08-09 RX ORDER — ALPRAZOLAM 0.25 MG/1
0.25 TABLET ORAL EVERY 6 HOURS PRN
Qty: 120 TABLET | Refills: 0 | Status: SHIPPED | OUTPATIENT
Start: 2023-08-09 | End: 2023-09-08

## 2023-08-09 NOTE — TELEPHONE ENCOUNTER
From: Mireya Yang  To: Mary Jane Arellano  Sent: 8/8/2023 9:50 AM EDT  Subject: Xanax     My xanax will be due to refill at [facility] on the 8th is that ok. Im taking as directed.

## 2023-08-15 ENCOUNTER — APPOINTMENT (OUTPATIENT)
Dept: PHARMACY | Age: 53
End: 2023-08-15
Payer: COMMERCIAL

## 2023-08-15 DIAGNOSIS — D68.61 ANTI-PHOSPHOLIPID ANTIBODY SYNDROME (HCC): Primary | ICD-10-CM

## 2023-08-15 DIAGNOSIS — Z51.81 ENCOUNTER FOR THERAPEUTIC DRUG MONITORING: ICD-10-CM

## 2023-08-15 DIAGNOSIS — Z79.01 ANTICOAGULATED ON WARFARIN: ICD-10-CM

## 2023-08-15 LAB — POC INR: 3.3 (ref 0.8–1.2)

## 2023-08-15 PROCEDURE — 99212 OFFICE O/P EST SF 10 MIN: CPT

## 2023-08-15 PROCEDURE — 36416 COLLJ CAPILLARY BLOOD SPEC: CPT

## 2023-08-15 PROCEDURE — 85610 PROTHROMBIN TIME: CPT

## 2023-08-15 NOTE — PROGRESS NOTES
Medication Management [address] [place] [address]  [phone] (phone)  [phone] (fax)    Ms. Jc Bautista is a 48 y.o.  female with history of antiphospholipid antibody syndrome, per referral from FAHAD Barnhart, who presents today for Warfarin monitoring and adjustment (1.5 week visit - 45 minutes early for visit). Patient verifies current dosing regimen and tablet strength. No missed or extra doses. Patient denies bleeding/bruising/swelling/SOB. No blood in urine or stool. No dietary changes. No changes in medication/OTC agents/herbals. No change in alcohol use or tobacco use. No change in activity level. Patient denies falls. No vomiting/diarrhea or acute illness. No procedures scheduled in the future at this time. Assessment:     Lab Results   Component Value Date    INR 3.30 (H) 08/15/2023    INR 3.50 (H) 08/04/2023    INR 2.30 (H) 07/21/2023     INR supratherapeutic - goal 2-3. 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 Decrease PO Coumadin to 3.75 mg MF, 7.5 mg TWThSS (from 3.75 mg M, 7.5 mg TWThFSS=7.7% decrease). Recheck INR in 2 week(s). (Report given - orders entered by HERBERT Tolentino, PharmD.)   Patient reminded to call the Anticoagulation Clinic with any signs or symptoms of bleeding or with any medication changes. Patient given instructions utilizing the teach back method. After visit summary printed and reviewed with patient. Advised extra caution. Discharged ambulatory in no apparent distress.     For Pharmacy Admin Tracking Only    Intervention Detail: Dose Adjustment: 1, reason: Therapy De-escalation  Total # of Interventions Recommended: 1  Total # of Interventions Accepted: 1  Time Spent (min):  24

## 2023-08-29 ENCOUNTER — HOSPITAL ENCOUNTER (OUTPATIENT)
Dept: PHARMACY | Age: 53
Setting detail: THERAPIES SERIES
Discharge: HOME | End: 2023-08-29
Payer: COMMERCIAL

## 2023-08-29 DIAGNOSIS — D68.61 ANTI-PHOSPHOLIPID ANTIBODY SYNDROME (HCC): Primary | ICD-10-CM

## 2023-08-29 DIAGNOSIS — Z51.81 ENCOUNTER FOR THERAPEUTIC DRUG MONITORING: ICD-10-CM

## 2023-08-29 DIAGNOSIS — Z79.01 ANTICOAGULATED ON WARFARIN: ICD-10-CM

## 2023-08-29 LAB — POC INR: 2.1 (ref 0.8–1.2)

## 2023-08-29 PROCEDURE — 85610 PROTHROMBIN TIME: CPT | Performed by: PHARMACIST

## 2023-08-29 PROCEDURE — 99211 OFF/OP EST MAY X REQ PHY/QHP: CPT | Performed by: PHARMACIST

## 2023-08-29 PROCEDURE — 36416 COLLJ CAPILLARY BLOOD SPEC: CPT | Performed by: PHARMACIST

## 2023-08-29 NOTE — PROGRESS NOTES
Medication Management [address] [place] [address]  [phone] (phone)  [phone] (fax)    Ms. Xuan Og is a 48 y.o.  female with history of Antiphospholipid Syndrome who presents today for anticoagulation monitoring and adjustment. Patient verifies current dosing regimen and tablet strength. No missed or extra doses. Patient denies s/s bleeding/bruising/swelling/SOB  No blood in urine or stool. Has been eating more salads recently, almost every day  No change in medications  No change in alcohol use or tobacco use. No change in activity level. Patient denies falls. No vomiting/diarrhea or acute illness. No Procedures scheduled in the future at this time. Assessment:   Lab Results   Component Value Date    INR 2.10 (H) 08/29/2023    INR 3.30 (H) 08/15/2023    INR 3.50 (H) 08/04/2023     INR therapeutic   Recent Labs     08/29/23  1415   INR 2.10*         Patient interview completed and discussed with pharmacist by Kelsea Cramer, PharmD Candidate. Detailed conversation with patient regarding goal INR. She prefers INR to be closer to 3.0. She requests to return to previous regimen. Patient had previously been fairly stable on previous regimen. Plan:  Increase Coumadin to 3.75mg M and 7.5mg all other days. Recheck INR in 2 week(s). Patient reminded to call the Anticoagulation Clinic with signs or symptoms of bleeding or with any medication changes. Patient given instructions utilizing the teach back method. After visit summary printed and reviewed with patient. Discharged ambulatory in no apparent distress.       For Pharmacy Admin Tracking Only    Intervention Detail: Dose Adjustment: 1, reason: Therapy Optimization  Total # of Interventions Recommended: 1  Total # of Interventions Accepted: 1  Time Spent (min): 20

## 2024-08-20 NOTE — PROGRESS NOTES
Medication Management [address]  [phone] (phone)  [phone] (fax)    Ms. Mike Noyola is a 46 y.o.  female with history of Antiphospholipid Syndrome who presents today for anticoagulation monitoring and adjustment. Patient verifies current dosing regimen and tablet strength. No missed or extra doses. Patient denies s/s bleeding/bruising/swelling/SOB/chest pain  No blood in urine or stool. Patient typically has iceberg salads frequently but hasn't had many in the last week due to increased stress with her mom's health. Plans to go back to her normal diet. No changes in medication/OTC agents/Herbals. No change in alcohol use or tobacco use. No change in activity level. Patient denies headaches/dizziness/lightheadedness/falls. No vomiting/diarrhea or acute illness. No Procedures scheduled in the future at this time. Patient did have ultrasound of her uterus and may end up needing a biopsy, not scheduled yet. Advised patient to call clinic if/when a biopsy is scheduled. Assessment:   Lab Results   Component Value Date    INR 3.10 (H) 08/16/2022    INR 2.30 (H) 07/21/2022    INR 2.40 (H) 07/07/2022     INR supratherapeutic   Recent Labs     08/16/22  1116   INR 3.10*       Plan:  Continue Coumadin 7.5mg daily. Recheck INR in 2 week(s). Patient reminded to call the Anticoagulation Clinic with any signs or symptoms of bleeding or with any medication changes. Patient given instructions utilizing the teach back method. After visit summary printed and reviewed with patient. Discharged ambulatory in no apparent distress. For Pharmacy Admin Tracking Only    Time Spent (min): 20    Yoko Whittington, BritniD  8/16/2022 11:18 AM No